# Patient Record
Sex: MALE | Race: WHITE | Employment: UNEMPLOYED | URBAN - METROPOLITAN AREA
[De-identification: names, ages, dates, MRNs, and addresses within clinical notes are randomized per-mention and may not be internally consistent; named-entity substitution may affect disease eponyms.]

---

## 2017-04-07 RX ORDER — AMLODIPINE BESYLATE 2.5 MG/1
TABLET ORAL
Qty: 30 TABLET | Refills: 0 | Status: SHIPPED | OUTPATIENT
Start: 2017-04-07 | End: 2017-05-18 | Stop reason: SDUPTHER

## 2017-04-07 RX ORDER — LISINOPRIL 20 MG/1
TABLET ORAL
Qty: 30 TABLET | Refills: 0 | Status: SHIPPED | OUTPATIENT
Start: 2017-04-07 | End: 2017-05-18 | Stop reason: SDUPTHER

## 2017-05-12 RX ORDER — LISINOPRIL 20 MG/1
TABLET ORAL
Qty: 30 TABLET | Refills: 0 | Status: CANCELLED | OUTPATIENT
Start: 2017-05-12

## 2017-05-12 RX ORDER — AMLODIPINE BESYLATE 2.5 MG/1
TABLET ORAL
Qty: 30 TABLET | Refills: 0 | Status: CANCELLED | OUTPATIENT
Start: 2017-05-12

## 2017-05-18 ENCOUNTER — OFFICE VISIT (OUTPATIENT)
Dept: FAMILY MEDICINE CLINIC | Age: 52
End: 2017-05-18

## 2017-05-18 VITALS
HEIGHT: 69 IN | WEIGHT: 169 LBS | BODY MASS INDEX: 25.03 KG/M2 | SYSTOLIC BLOOD PRESSURE: 155 MMHG | OXYGEN SATURATION: 95 % | RESPIRATION RATE: 18 BRPM | DIASTOLIC BLOOD PRESSURE: 99 MMHG | TEMPERATURE: 97.1 F | HEART RATE: 80 BPM

## 2017-05-18 DIAGNOSIS — J30.89 ALLERGIC RHINITIS DUE TO OTHER ALLERGIC TRIGGER, UNSPECIFIED RHINITIS SEASONALITY: ICD-10-CM

## 2017-05-18 DIAGNOSIS — I10 ESSENTIAL HYPERTENSION: Primary | ICD-10-CM

## 2017-05-18 DIAGNOSIS — Z23 ENCOUNTER FOR IMMUNIZATION: ICD-10-CM

## 2017-05-18 PROCEDURE — 90732 PPSV23 VACC 2 YRS+ SUBQ/IM: CPT | Performed by: NURSE PRACTITIONER

## 2017-05-18 PROCEDURE — 90472 IMMUNIZATION ADMIN EACH ADD: CPT | Performed by: NURSE PRACTITIONER

## 2017-05-18 PROCEDURE — 90715 TDAP VACCINE 7 YRS/> IM: CPT | Performed by: NURSE PRACTITIONER

## 2017-05-18 PROCEDURE — 99213 OFFICE O/P EST LOW 20 MIN: CPT | Performed by: NURSE PRACTITIONER

## 2017-05-18 PROCEDURE — 90471 IMMUNIZATION ADMIN: CPT | Performed by: NURSE PRACTITIONER

## 2017-05-18 RX ORDER — LISINOPRIL 20 MG/1
TABLET ORAL
Qty: 30 TABLET | Refills: 3 | Status: SHIPPED | OUTPATIENT
Start: 2017-05-18 | End: 2017-09-08 | Stop reason: SDUPTHER

## 2017-05-18 RX ORDER — AMLODIPINE BESYLATE 2.5 MG/1
TABLET ORAL
Qty: 30 TABLET | Refills: 3 | Status: SHIPPED | OUTPATIENT
Start: 2017-05-18 | End: 2017-09-08 | Stop reason: SDUPTHER

## 2017-05-18 ASSESSMENT — ENCOUNTER SYMPTOMS
SHORTNESS OF BREATH: 0
ABDOMINAL PAIN: 0
CONSTIPATION: 0
CHEST TIGHTNESS: 0
RHINORRHEA: 1
COUGH: 0
NAUSEA: 0
DIARRHEA: 0

## 2017-05-18 ASSESSMENT — PATIENT HEALTH QUESTIONNAIRE - PHQ9
SUM OF ALL RESPONSES TO PHQ QUESTIONS 1-9: 0
1. LITTLE INTEREST OR PLEASURE IN DOING THINGS: 0
SUM OF ALL RESPONSES TO PHQ9 QUESTIONS 1 & 2: 0
2. FEELING DOWN, DEPRESSED OR HOPELESS: 0

## 2017-10-30 RX ORDER — LISINOPRIL 20 MG/1
TABLET ORAL
Qty: 14 TABLET | Refills: 0 | OUTPATIENT
Start: 2017-10-30

## 2017-10-30 RX ORDER — AMLODIPINE BESYLATE 2.5 MG/1
TABLET ORAL
Qty: 14 TABLET | Refills: 0 | OUTPATIENT
Start: 2017-10-30

## 2017-10-31 NOTE — TELEPHONE ENCOUNTER
Next Visit Date:  Future Appointments  Date Time Provider Carlton Granados   11/2/2017 1:30 PM Jurgen Cummings, [facility] [address] [address] Labs Maintenance   Topic Date Due    Hepatitis C screen  1965    HIV screen  02/25/1978    Colon cancer screen colonoscopy  02/25/2013    Flu vaccine (1) 09/01/2017    Diabetes screen  11/02/2018    Lipid screen  11/02/2020    DTaP/Tdap/Td vaccine (2 - Td) 05/18/2027    Pneumococcal med risk  Completed       Hemoglobin A1C (%)   Date Value   11/02/2015 5.4             ( goal A1C is < 7)   No results found for: LABMICR  LDL Cholesterol (mg/dL)   Date Value   11/02/2015 98       (goal LDL is <100)   BUN (mg/dL)   Date Value   12/01/2013 12     BP Readings from Last 3 Encounters:   05/18/17 (!) 155/99   10/07/16 (!) 149/93   10/27/15 150/80          (goal 120/80)    All Future Testing planned in [facility]  Lab Frequency Next Occurrence               Patient Active Problem List:     Chest pain     Tobacco abuse     HTN (hypertension)     Hypertension

## 2017-11-01 RX ORDER — LISINOPRIL 20 MG/1
TABLET ORAL
Qty: 14 TABLET | Refills: 0 | Status: SHIPPED | OUTPATIENT
Start: 2017-11-01 | End: 2017-11-02 | Stop reason: SDUPTHER

## 2017-11-01 RX ORDER — AMLODIPINE BESYLATE 2.5 MG/1
TABLET ORAL
Qty: 14 TABLET | Refills: 0 | Status: SHIPPED | OUTPATIENT
Start: 2017-11-01 | End: 2017-11-02 | Stop reason: SDUPTHER

## 2017-11-02 ENCOUNTER — OFFICE VISIT (OUTPATIENT)
Dept: FAMILY MEDICINE CLINIC | Age: 52
End: 2017-11-02

## 2017-11-02 VITALS
OXYGEN SATURATION: 99 % | BODY MASS INDEX: 25.48 KG/M2 | SYSTOLIC BLOOD PRESSURE: 162 MMHG | TEMPERATURE: 96.8 F | HEART RATE: 96 BPM | DIASTOLIC BLOOD PRESSURE: 88 MMHG | WEIGHT: 172 LBS | HEIGHT: 69 IN | RESPIRATION RATE: 20 BRPM

## 2017-11-02 DIAGNOSIS — Z23 NEEDS FLU SHOT: ICD-10-CM

## 2017-11-02 DIAGNOSIS — Z12.11 SCREENING FOR MALIGNANT NEOPLASM OF COLON: ICD-10-CM

## 2017-11-02 DIAGNOSIS — I10 ESSENTIAL HYPERTENSION: Primary | ICD-10-CM

## 2017-11-02 DIAGNOSIS — L40.9 PSORIASIS: ICD-10-CM

## 2017-11-02 DIAGNOSIS — Z91.199 NON-COMPLIANCE: ICD-10-CM

## 2017-11-02 PROCEDURE — 96372 THER/PROPH/DIAG INJ SC/IM: CPT | Performed by: NURSE PRACTITIONER

## 2017-11-02 PROCEDURE — 90688 IIV4 VACCINE SPLT 0.5 ML IM: CPT | Performed by: NURSE PRACTITIONER

## 2017-11-02 PROCEDURE — 90472 IMMUNIZATION ADMIN EACH ADD: CPT | Performed by: NURSE PRACTITIONER

## 2017-11-02 PROCEDURE — 90471 IMMUNIZATION ADMIN: CPT | Performed by: NURSE PRACTITIONER

## 2017-11-02 PROCEDURE — 99214 OFFICE O/P EST MOD 30 MIN: CPT | Performed by: NURSE PRACTITIONER

## 2017-11-02 RX ORDER — METHYLPREDNISOLONE ACETATE 40 MG/ML
40 INJECTION, SUSPENSION INTRA-ARTICULAR; INTRALESIONAL; INTRAMUSCULAR; SOFT TISSUE ONCE
Status: COMPLETED | OUTPATIENT
Start: 2017-11-02 | End: 2017-11-02

## 2017-11-02 RX ORDER — LISINOPRIL 20 MG/1
TABLET ORAL
Qty: 90 TABLET | Refills: 1 | Status: SHIPPED | OUTPATIENT
Start: 2017-11-02 | End: 2018-05-14 | Stop reason: SDUPTHER

## 2017-11-02 RX ORDER — AMLODIPINE BESYLATE 5 MG/1
TABLET ORAL
Qty: 90 TABLET | Refills: 1 | Status: SHIPPED | OUTPATIENT
Start: 2017-11-02 | End: 2018-04-26 | Stop reason: SDUPTHER

## 2017-11-02 RX ORDER — CALCIPOTRIENE 50 UG/G
CREAM TOPICAL
Qty: 1 TUBE | Refills: 0 | Status: SHIPPED | OUTPATIENT
Start: 2017-11-02 | End: 2018-12-04 | Stop reason: ALTCHOICE

## 2017-11-02 RX ADMIN — METHYLPREDNISOLONE ACETATE 40 MG: 40 INJECTION, SUSPENSION INTRA-ARTICULAR; INTRALESIONAL; INTRAMUSCULAR; SOFT TISSUE at 14:35

## 2017-11-02 NOTE — PROGRESS NOTES
Have you seen any other physician or provider since your last visit no    Have you had any other diagnostic tests since your last visit? no    Have you changed or stopped any medications since your last visit including any over-the-counter medicines, vitamins, or herbal medicines? no     Are you taking all your prescribed medications? Yes  If NO, why? -  N/A           Patient Self-Management Goal for this visit.    What is your goal for your visit today? - htn   Barriers to success: none   Plan for overcoming my barriers: N/A      Confidence: 10/10   Date goal set: 11/2/17   Date expected to reach goal: 3days    Medical history Review  Past Medical, Family, and Social History reviewed and does not contribute to the patient presenting condition    Health Maintenance Due   Topic Date Due    Hepatitis C screen  1965    HIV screen  02/25/1978    Colon cancer screen colonoscopy  02/25/2013    Flu vaccine (1) 09/01/2017
 Alcohol use Yes      No Known Allergies    Subjective:      Review of Systems   Constitutional: Negative for chills and fever. Cardiovascular: Negative for chest pain, palpitations and leg swelling. Skin: Positive for rash. Other pertinent ROS in HPI  Objective:     BP (!) 162/88   Pulse 96   Temp 96.8 °F (36 °C) (Oral)   Resp 20   Ht 5' 8.9\" (1.75 m)   Wt 172 lb (78 kg)   SpO2 99%   BMI 25.48 kg/m²    Physical Exam   Constitutional: He is oriented to person, place, and time. He appears well-developed and well-nourished. HENT:   Head: Normocephalic and atraumatic. Right Ear: External ear normal.   Left Ear: External ear normal.   Nose: Nose normal.   Mouth/Throat: Oropharynx is clear and moist.   Eyes: Conjunctivae and EOM are normal. Pupils are equal, round, and reactive to light. Neck: Normal range of motion. Cardiovascular: Normal rate, regular rhythm and normal heart sounds. Pulmonary/Chest: Effort normal and breath sounds normal.   Abdominal: Soft. Bowel sounds are normal.   Musculoskeletal: Normal range of motion. Pain free ROM     Neurological: He is alert and oriented to person, place, and time. Gait normal     Skin: Skin is warm and dry. No rash noted. Psychiatric: He has a normal mood and affect. His behavior is normal. Judgment and thought content normal.     Assessment:       1. Essential hypertension  amLODIPine (NORVASC) 5 MG tablet    lisinopril (PRINIVIL;ZESTRIL) 20 MG tablet   2. Psoriasis  methylPREDNISolone acetate (DEPO-MEDROL) injection 40 mg    calcipotriene (DOVONEX) 0.005 % cream   3. Screening for malignant neoplasm of colon  POCT Fecal Immunochemical Test (FIT)   4. Non-compliance     5. Needs flu shot  INFLUENZA, QUADV, 3 YRS AND OLDER, IM, MDV, 0.5ML (Josh Watts)       Plan:      1.  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

## 2018-05-14 DIAGNOSIS — I10 ESSENTIAL HYPERTENSION: ICD-10-CM

## 2018-05-17 RX ORDER — LISINOPRIL 20 MG/1
TABLET ORAL
Qty: 30 TABLET | Refills: 5 | OUTPATIENT
Start: 2018-05-17

## 2018-05-18 ENCOUNTER — TELEPHONE (OUTPATIENT)
Dept: FAMILY MEDICINE CLINIC | Age: 53
End: 2018-05-18

## 2018-05-18 DIAGNOSIS — I10 ESSENTIAL HYPERTENSION: ICD-10-CM

## 2018-05-18 RX ORDER — LISINOPRIL 20 MG/1
TABLET ORAL
Qty: 90 TABLET | Refills: 0 | Status: SHIPPED | OUTPATIENT
Start: 2018-05-18 | End: 2018-05-18 | Stop reason: SDUPTHER

## 2018-05-22 RX ORDER — LISINOPRIL 20 MG/1
TABLET ORAL
Qty: 90 TABLET | Refills: 0 | Status: SHIPPED | OUTPATIENT
Start: 2018-05-22 | End: 2018-11-21 | Stop reason: SDUPTHER

## 2018-08-03 DIAGNOSIS — I10 ESSENTIAL HYPERTENSION: ICD-10-CM

## 2018-08-03 RX ORDER — AMLODIPINE BESYLATE 5 MG/1
5 TABLET ORAL DAILY
Qty: 7 TABLET | Refills: 0 | Status: SHIPPED | OUTPATIENT
Start: 2018-08-03 | End: 2018-10-26 | Stop reason: SDUPTHER

## 2018-10-26 DIAGNOSIS — I10 ESSENTIAL HYPERTENSION: ICD-10-CM

## 2018-10-26 RX ORDER — AMLODIPINE BESYLATE 5 MG/1
TABLET ORAL
Qty: 14 TABLET | Refills: 0 | Status: SHIPPED | OUTPATIENT
Start: 2018-10-26 | End: 2018-11-18 | Stop reason: SDUPTHER

## 2018-10-26 NOTE — TELEPHONE ENCOUNTER
Last visit: 11/2/18  Last Med refill: 8/3/18 #7 with 0 refills    PATIENT NEEDS APPT    Next Visit Date:  No future appointments.     Health Maintenance   Topic Date Due    Hepatitis C screen  1965    HIV screen  02/25/1978    Shingles Vaccine (1 of 2 - 2 Dose Series) 02/25/2013    Colon cancer screen colonoscopy  02/25/2013    Potassium monitoring  12/01/2014    Creatinine monitoring  12/01/2014    Flu vaccine (1) 09/01/2018    Diabetes screen  11/02/2018    Lipid screen  11/02/2020    DTaP/Tdap/Td vaccine (2 - Td) 05/18/2027    Pneumococcal med risk  Completed       Hemoglobin A1C (%)   Date Value   11/02/2015 5.4             ( goal A1C is < 7)   No results found for: LABMICR  LDL Cholesterol (mg/dL)   Date Value   11/02/2015 98   12/02/2013 69       (goal LDL is <100)   BUN (mg/dL)   Date Value   12/01/2013 12     BP Readings from Last 3 Encounters:   11/02/17 (!) 162/88   05/18/17 (!) 155/99   10/07/16 (!) 149/93          (goal 120/80)    All Future Testing planned in [facility]  Lab Frequency Next Occurrence               Patient Active Problem List:     Chest pain     Tobacco abuse     HTN (hypertension)     Hypertension

## 2018-11-20 DIAGNOSIS — I10 ESSENTIAL HYPERTENSION: ICD-10-CM

## 2018-11-20 RX ORDER — LISINOPRIL 20 MG/1
TABLET ORAL
Qty: 30 TABLET | Refills: 0 | OUTPATIENT
Start: 2018-11-20

## 2018-11-20 NOTE — TELEPHONE ENCOUNTER
Filled 5/22/18 #90 with 0 RF  Last seen 2017. Asking for enough to last until appt 12/4/18.     Next Visit Date:  Future Appointments  Date Time Provider Carlton Granados   12/4/2018 1:45 PM TARSHA Chavarria CNP [facility] [address] Maintenance   Topic Date Due    Hepatitis C screen  1965    HIV screen  02/25/1978    Shingles Vaccine (1 of 2 - 2 Dose Series) 02/25/2013    Colon cancer screen colonoscopy  02/25/2013    Potassium monitoring  12/01/2014    Creatinine monitoring  12/01/2014    Flu vaccine (1) 09/01/2018    Lipid screen  11/02/2020    DTaP/Tdap/Td vaccine (2 - Td) 05/18/2027    Pneumococcal med risk  Completed       Hemoglobin A1C (%)   Date Value   11/02/2015 5.4             ( goal A1C is < 7)   No results found for: LABMICR  LDL Cholesterol (mg/dL)   Date Value   11/02/2015 98   12/02/2013 69       (goal LDL is <100)   BUN (mg/dL)   Date Value   12/01/2013 12     BP Readings from Last 3 Encounters:   11/02/17 (!) 162/88   05/18/17 (!) 155/99   10/07/16 (!) 149/93          (goal 120/80)    All Future Testing planned in [facility]  Lab Frequency Next Occurrence               Patient Active Problem List:     Chest pain     Tobacco abuse     HTN (hypertension)     Hypertension

## 2018-11-21 RX ORDER — LISINOPRIL 20 MG/1
TABLET ORAL
Qty: 20 TABLET | Refills: 0 | Status: SHIPPED | OUTPATIENT
Start: 2018-11-21 | End: 2018-12-04 | Stop reason: SDUPTHER

## 2018-12-04 ENCOUNTER — HOSPITAL ENCOUNTER (OUTPATIENT)
Age: 53
Setting detail: SPECIMEN
Discharge: HOME | End: 2018-12-04

## 2018-12-04 ENCOUNTER — OFFICE VISIT (OUTPATIENT)
Dept: FAMILY MEDICINE CLINIC | Age: 53
End: 2018-12-04

## 2018-12-04 VITALS
DIASTOLIC BLOOD PRESSURE: 80 MMHG | HEIGHT: 68 IN | OXYGEN SATURATION: 96 % | SYSTOLIC BLOOD PRESSURE: 128 MMHG | HEART RATE: 100 BPM | TEMPERATURE: 97.4 F | WEIGHT: 182.6 LBS | BODY MASS INDEX: 27.68 KG/M2

## 2018-12-04 DIAGNOSIS — Z13.220 SCREENING FOR HYPERLIPIDEMIA: ICD-10-CM

## 2018-12-04 DIAGNOSIS — I10 ESSENTIAL HYPERTENSION: Primary | ICD-10-CM

## 2018-12-04 DIAGNOSIS — Z23 NEED FOR INFLUENZA VACCINATION: ICD-10-CM

## 2018-12-04 DIAGNOSIS — Z12.5 SCREENING FOR MALIGNANT NEOPLASM OF PROSTATE: ICD-10-CM

## 2018-12-04 DIAGNOSIS — I10 ESSENTIAL HYPERTENSION: ICD-10-CM

## 2018-12-04 LAB
ALBUMIN SERPL-MCNC: 4.7 G/DL (ref 3.5–5.2)
ALBUMIN/GLOBULIN RATIO: 1.7 (ref 1–2.5)
ALP BLD-CCNC: 60 U/L (ref 40–129)
ALT SERPL-CCNC: 41 U/L (ref 5–41)
ANION GAP SERPL CALCULATED.3IONS-SCNC: 14 MMOL/L (ref 9–17)
AST SERPL-CCNC: 29 U/L
BILIRUB SERPL-MCNC: 0.51 MG/DL (ref 0.3–1.2)
BUN BLDV-MCNC: 20 MG/DL (ref 6–20)
BUN/CREAT BLD: ABNORMAL (ref 9–20)
CALCIUM SERPL-MCNC: 9.3 MG/DL (ref 8.6–10.4)
CHLORIDE BLD-SCNC: 100 MMOL/L (ref 98–107)
CHOLESTEROL/HDL RATIO: 3
CHOLESTEROL: 205 MG/DL
CO2: 25 MMOL/L (ref 20–31)
CREAT SERPL-MCNC: 0.85 MG/DL (ref 0.7–1.2)
GFR AFRICAN AMERICAN: >60 ML/MIN
GFR NON-AFRICAN AMERICAN: >60 ML/MIN
GFR SERPL CREATININE-BSD FRML MDRD: ABNORMAL ML/MIN/{1.73_M2}
GFR SERPL CREATININE-BSD FRML MDRD: ABNORMAL ML/MIN/{1.73_M2}
GLUCOSE BLD-MCNC: 105 MG/DL (ref 70–99)
HCT VFR BLD CALC: 49.1 % (ref 40.7–50.3)
HDLC SERPL-MCNC: 69 MG/DL
HEMOGLOBIN: 16.5 G/DL (ref 13–17)
LDL CHOLESTEROL: 112 MG/DL (ref 0–130)
MCH RBC QN AUTO: 30.8 PG (ref 25.2–33.5)
MCHC RBC AUTO-ENTMCNC: 33.6 G/DL (ref 28.4–34.8)
MCV RBC AUTO: 91.6 FL (ref 82.6–102.9)
NRBC AUTOMATED: 0 PER 100 WBC
PDW BLD-RTO: 12.6 % (ref 11.8–14.4)
PLATELET # BLD: 223 K/UL (ref 138–453)
PMV BLD AUTO: 12.2 FL (ref 8.1–13.5)
POTASSIUM SERPL-SCNC: 4.4 MMOL/L (ref 3.7–5.3)
RBC # BLD: 5.36 M/UL (ref 4.21–5.77)
SODIUM BLD-SCNC: 139 MMOL/L (ref 135–144)
TOTAL PROTEIN: 7.4 G/DL (ref 6.4–8.3)
TRIGL SERPL-MCNC: 121 MG/DL
VLDLC SERPL CALC-MCNC: ABNORMAL MG/DL (ref 1–30)
WBC # BLD: 9.8 K/UL (ref 3.5–11.3)

## 2018-12-04 PROCEDURE — 90471 IMMUNIZATION ADMIN: CPT | Performed by: NURSE PRACTITIONER

## 2018-12-04 PROCEDURE — 99214 OFFICE O/P EST MOD 30 MIN: CPT | Performed by: NURSE PRACTITIONER

## 2018-12-04 PROCEDURE — 90688 IIV4 VACCINE SPLT 0.5 ML IM: CPT | Performed by: NURSE PRACTITIONER

## 2018-12-04 PROCEDURE — 36415 COLL VENOUS BLD VENIPUNCTURE: CPT | Performed by: NURSE PRACTITIONER

## 2018-12-04 RX ORDER — LISINOPRIL 20 MG/1
TABLET ORAL
Qty: 90 TABLET | Refills: 1 | Status: SHIPPED | OUTPATIENT
Start: 2018-12-04 | End: 2019-06-11 | Stop reason: SDUPTHER

## 2018-12-04 RX ORDER — AMLODIPINE BESYLATE 5 MG/1
TABLET ORAL
Qty: 90 TABLET | Refills: 1 | Status: SHIPPED | OUTPATIENT
Start: 2018-12-04 | End: 2019-06-11 | Stop reason: SDUPTHER

## 2018-12-04 ASSESSMENT — ENCOUNTER SYMPTOMS
COUGH: 0
SHORTNESS OF BREATH: 0

## 2018-12-04 ASSESSMENT — PATIENT HEALTH QUESTIONNAIRE - PHQ9
SUM OF ALL RESPONSES TO PHQ QUESTIONS 1-9: 0
SUM OF ALL RESPONSES TO PHQ QUESTIONS 1-9: 0
1. LITTLE INTEREST OR PLEASURE IN DOING THINGS: 0
2. FEELING DOWN, DEPRESSED OR HOPELESS: 0
SUM OF ALL RESPONSES TO PHQ9 QUESTIONS 1 & 2: 0

## 2018-12-04 NOTE — PATIENT INSTRUCTIONS
trying to quit smoking. · Consider signing up for a smoking cessation program, such as the American Lung Association's Freedom from Smoking program.  · Get text messaging support. Go to the website at www.smokefree. gov to sign up for the [facility] program.  · Set a quit date. Pick your date carefully so that it is not right in the middle of a big deadline or stressful time. Once you quit, do not even take a puff. Get rid of all ashtrays and lighters after your last cigarette. Clean your house and your clothes so that they do not smell of smoke. · Learn how to be a nonsmoker. Think about ways you can avoid those things that make you reach for a cigarette. ? Avoid situations that put you at greatest risk for smoking. For some people, it is hard to have a drink with friends without smoking. For others, they might skip a coffee break with coworkers who smoke. ? Change your daily routine. Take a different route to work or eat a meal in a different place. · Cut down on stress. Calm yourself or release tension by doing an activity you enjoy, such as reading a book, taking a hot bath, or gardening. · Talk to your doctor or pharmacist about nicotine replacement therapy, which replaces the nicotine in your body. You still get nicotine but you do not use tobacco. Nicotine replacement products help you slowly reduce the amount of nicotine you need. These products come in several forms, many of them available over-the-counter:  ? Nicotine patches  ? Nicotine gum and lozenges  ? Nicotine inhaler  · Ask your doctor about bupropion (Wellbutrin) or varenicline (Chantix), which are prescription medicines. They do not contain nicotine. They help you by reducing withdrawal symptoms, such as stress and anxiety. · Some people find hypnosis, acupuncture, and massage helpful for ending the smoking habit. · Eat a healthy diet and get regular exercise.  Having healthy habits will help your body move past its craving for nicotine. · Be prepared to keep trying. Most people are not successful the first few times they try to quit. Do not get mad at yourself if you smoke again. Make a list of things you learned and think about when you want to try again, such as next week, next month, or next year. Where can you learn more? Go to https://TextRecruitpeabdifatahewwill.Reachable. org and sign in to your [email or web address] account. Enter C152 in the Telepartner box to learn more about \"Stopping Smoking: Care Instructions. \"     If you do not have an account, please click on the \"Sign Up Now\" link. Current as of: November 29, 2017  Content Version: 11.8  © 7053-5204 Healthwise, Incorporated. Care instructions adapted under license by [facility] ([facility]). If you have questions about a medical condition or this instruction, always ask your healthcare professional. [address] any warranty or liability for your use of this information.

## 2018-12-04 NOTE — PROGRESS NOTES
Patient is present for medication refills  Patient last seen 11/2/17    Patient is due for labs    Pharmacy and medication reviewed with patient    Visit Information    Have you changed or started any medications since your last visit including any over-the-counter medicines, vitamins, or herbal medicines? no   Have you stopped taking any of your medications? Is so, why? -  no  Are you having any side effects from any of your medications? - no    Have you seen any other physician or provider since your last visit?  no   Have you had any other diagnostic tests since your last visit?  no   Have you been seen in the emergency room and/or had an admission in a hospital since we last saw you?  no   Have you had your routine dental cleaning in the past 6 months?  no     Do you have an active MyChart account? If no, what is the barrier?   Yes    Patient Care Team:  TARSHA Romo CNP as PCP - General (Nurse Practitioner Family)    Medical History Review  Past Medical, Family, and Social History reviewed and does contribute to the patient presenting condition    Health Maintenance   Topic Date Due    Hepatitis C screen  1965    HIV screen  02/25/1978    Shingles Vaccine (1 of 2 - 2 Dose Series) 02/25/2013    Colon cancer screen colonoscopy  02/25/2013    Potassium monitoring  12/01/2014    Creatinine monitoring  12/01/2014    Flu vaccine (1) 09/01/2018    Lipid screen  11/02/2020    DTaP/Tdap/Td vaccine (2 - Td) 05/18/2027    Pneumococcal med risk  Completed
instructions when applicable  3. Discussed use, benefit, and side effects of prescribed medications. Barriersto medication compliance addressed. All patient questions answered. Pt voiced understanding. 4.  Reviewed prior labs and health maintenance  5. Continue current medications, diet and exercise.     CompletedRefills   Requested Prescriptions     Pending Prescriptions Disp Refills    lisinopril (PRINIVIL;ZESTRIL) 20 MG tablet 90 tablet 1     Sig: take 1 tablet by mouth once daily    amLODIPine (NORVASC) 5 MG tablet 90 tablet 1     Sig: take 1 tablet by mouth once daily         Electronically signed by Ranjan Solis CNP on 12/4/2018 at 2:02 PM

## 2018-12-05 LAB — PROSTATE SPECIFIC ANTIGEN: 3.62 UG/L

## 2018-12-10 DIAGNOSIS — E78.2 MIXED HYPERLIPIDEMIA: Primary | ICD-10-CM

## 2018-12-10 RX ORDER — SIMVASTATIN 20 MG
20 TABLET ORAL NIGHTLY
Qty: 90 TABLET | Refills: 1 | Status: SHIPPED | OUTPATIENT
Start: 2018-12-10 | End: 2019-06-11 | Stop reason: SDUPTHER

## 2019-06-11 DIAGNOSIS — E78.2 MIXED HYPERLIPIDEMIA: ICD-10-CM

## 2019-06-11 DIAGNOSIS — I10 ESSENTIAL HYPERTENSION: ICD-10-CM

## 2019-06-11 RX ORDER — LISINOPRIL 20 MG/1
TABLET ORAL
Qty: 30 TABLET | Refills: 0 | Status: SHIPPED | OUTPATIENT
Start: 2019-06-11 | End: 2019-07-12 | Stop reason: SDUPTHER

## 2019-06-11 RX ORDER — SIMVASTATIN 20 MG
20 TABLET ORAL NIGHTLY
Qty: 30 TABLET | Refills: 0 | Status: SHIPPED | OUTPATIENT
Start: 2019-06-11 | End: 2019-07-12 | Stop reason: SDUPTHER

## 2019-06-11 RX ORDER — AMLODIPINE BESYLATE 5 MG/1
TABLET ORAL
Qty: 30 TABLET | Refills: 0 | Status: SHIPPED | OUTPATIENT
Start: 2019-06-11 | End: 2019-07-12 | Stop reason: SDUPTHER

## 2019-06-11 NOTE — TELEPHONE ENCOUNTER
Last visit: 12/4/18  Last Med refill: 12/4/18, 12/10/18  Does patient have enough medication for 72 hours: No:     Pt was informed short supply will be called in until appt.  Last seen 12/4/18    Next Visit Date:  Future Appointments   Date Time Provider Carlton Granados   6/25/2019  4:15 PM Stacia Valerio  [address] Maintenance   Topic Date Due    Colon cancer screen colonoscopy  02/25/2013    Diabetes screen  11/02/2018    Shingles Vaccine (1 of 2) 12/04/2019 (Originally 2/25/2013)    Hepatitis C screen  12/04/2019 (Originally 1965)    HIV screen  12/04/2019 (Originally 2/25/1978)    Potassium monitoring  12/04/2019    Creatinine monitoring  12/04/2019    Lipid screen  12/04/2023    DTaP/Tdap/Td vaccine (2 - Td) 05/18/2027    Flu vaccine  Completed    Pneumococcal 0-64 years Vaccine  Completed       Hemoglobin A1C (%)   Date Value   11/02/2015 5.4             ( goal A1C is < 7)   No results found for: LABMICR  LDL Cholesterol (mg/dL)   Date Value   12/04/2018 112   11/02/2015 98       (goal LDL is <100)   AST (U/L)   Date Value   12/04/2018 29     ALT (U/L)   Date Value   12/04/2018 41     BUN (mg/dL)   Date Value   12/04/2018 20     BP Readings from Last 3 Encounters:   12/04/18 128/80   11/02/17 (!) 162/88   05/18/17 (!) 155/99          (goal 120/80)    All Future Testing planned in [facility]  Lab Frequency Next Occurrence               Patient Active Problem List:     Chest pain     Tobacco abuse     HTN (hypertension)     Hypertension

## 2019-07-12 ENCOUNTER — TELEPHONE (OUTPATIENT)
Dept: FAMILY MEDICINE CLINIC | Age: 54
End: 2019-07-12

## 2019-07-12 DIAGNOSIS — E78.2 MIXED HYPERLIPIDEMIA: ICD-10-CM

## 2019-07-12 DIAGNOSIS — I10 ESSENTIAL HYPERTENSION: ICD-10-CM

## 2019-07-12 RX ORDER — SIMVASTATIN 20 MG
20 TABLET ORAL NIGHTLY
Qty: 30 TABLET | Refills: 0 | Status: SHIPPED | OUTPATIENT
Start: 2019-07-12 | End: 2019-08-20 | Stop reason: SDUPTHER

## 2019-07-12 RX ORDER — SIMVASTATIN 20 MG
20 TABLET ORAL NIGHTLY
Qty: 7 TABLET | Refills: 0 | Status: CANCELLED | OUTPATIENT
Start: 2019-07-12

## 2019-07-12 RX ORDER — LISINOPRIL 20 MG/1
TABLET ORAL
Qty: 30 TABLET | Refills: 0 | Status: SHIPPED | OUTPATIENT
Start: 2019-07-12 | End: 2019-08-20 | Stop reason: SDUPTHER

## 2019-07-12 RX ORDER — AMLODIPINE BESYLATE 5 MG/1
TABLET ORAL
Qty: 30 TABLET | Refills: 0 | Status: SHIPPED | OUTPATIENT
Start: 2019-07-12 | End: 2019-09-10 | Stop reason: SDUPTHER

## 2019-07-12 RX ORDER — LISINOPRIL 20 MG/1
TABLET ORAL
Qty: 7 TABLET | Refills: 0 | Status: CANCELLED | OUTPATIENT
Start: 2019-07-12

## 2019-07-12 RX ORDER — AMLODIPINE BESYLATE 5 MG/1
TABLET ORAL
Qty: 7 TABLET | Refills: 0 | Status: CANCELLED | OUTPATIENT
Start: 2019-07-12

## 2019-08-20 DIAGNOSIS — I10 ESSENTIAL HYPERTENSION: ICD-10-CM

## 2019-08-20 DIAGNOSIS — E78.2 MIXED HYPERLIPIDEMIA: ICD-10-CM

## 2019-08-20 RX ORDER — SIMVASTATIN 20 MG
20 TABLET ORAL NIGHTLY
Qty: 30 TABLET | Refills: 0 | Status: SHIPPED | OUTPATIENT
Start: 2019-08-20 | End: 2019-09-10 | Stop reason: SDUPTHER

## 2019-08-20 RX ORDER — LISINOPRIL 20 MG/1
TABLET ORAL
Qty: 30 TABLET | Refills: 0 | Status: SHIPPED | OUTPATIENT
Start: 2019-08-20 | End: 2019-09-10 | Stop reason: SDUPTHER

## 2019-08-20 NOTE — TELEPHONE ENCOUNTER
Last visit: 12/4/18  Last Med refill: 7/12/19  Does patient have enough medication for 72 hours: No:     Next Visit Date:  Future Appointments   Date Time Provider Carlton Granados   9/10/2019 10:15 AM Stacia Arzola  [address] Maintenance   Topic Date Due    Colon cancer screen colonoscopy  02/25/2013    Diabetes screen  11/02/2018    Shingles Vaccine (1 of 2) 12/04/2019 (Originally 2/25/2013)    Hepatitis C screen  12/04/2019 (Originally 1965)    HIV screen  12/04/2019 (Originally 2/25/1978)    Flu vaccine (1) 09/01/2019    Potassium monitoring  12/04/2019    Creatinine monitoring  12/04/2019    Lipid screen  12/04/2023    DTaP/Tdap/Td vaccine (2 - Td) 05/18/2027    Pneumococcal 0-64 years Vaccine  Completed       Hemoglobin A1C (%)   Date Value   11/02/2015 5.4             ( goal A1C is < 7)   No results found for: LABMICR  LDL Cholesterol (mg/dL)   Date Value   12/04/2018 112   11/02/2015 98       (goal LDL is <100)   AST (U/L)   Date Value   12/04/2018 29     ALT (U/L)   Date Value   12/04/2018 41     BUN (mg/dL)   Date Value   12/04/2018 20     BP Readings from Last 3 Encounters:   12/04/18 128/80   11/02/17 (!) 162/88   05/18/17 (!) 155/99          (goal 120/80)    All Future Testing planned in [facility]  Lab Frequency Next Occurrence               Patient Active Problem List:     Chest pain     Tobacco abuse     HTN (hypertension)     Hypertension

## 2019-09-10 ENCOUNTER — OFFICE VISIT (OUTPATIENT)
Dept: FAMILY MEDICINE CLINIC | Age: 54
End: 2019-09-10

## 2019-09-10 VITALS
OXYGEN SATURATION: 97 % | HEART RATE: 86 BPM | HEIGHT: 68 IN | DIASTOLIC BLOOD PRESSURE: 83 MMHG | BODY MASS INDEX: 27.07 KG/M2 | WEIGHT: 178.6 LBS | SYSTOLIC BLOOD PRESSURE: 128 MMHG | TEMPERATURE: 98.3 F

## 2019-09-10 DIAGNOSIS — Z72.0 TOBACCO ABUSE: ICD-10-CM

## 2019-09-10 DIAGNOSIS — I10 ESSENTIAL HYPERTENSION: Primary | ICD-10-CM

## 2019-09-10 DIAGNOSIS — Z12.5 SCREENING FOR PROSTATE CANCER: ICD-10-CM

## 2019-09-10 DIAGNOSIS — Z23 FLU VACCINE NEED: ICD-10-CM

## 2019-09-10 DIAGNOSIS — E78.2 MIXED HYPERLIPIDEMIA: ICD-10-CM

## 2019-09-10 PROCEDURE — 90688 IIV4 VACCINE SPLT 0.5 ML IM: CPT | Performed by: INTERNAL MEDICINE

## 2019-09-10 PROCEDURE — 90471 IMMUNIZATION ADMIN: CPT | Performed by: INTERNAL MEDICINE

## 2019-09-10 PROCEDURE — 99214 OFFICE O/P EST MOD 30 MIN: CPT | Performed by: INTERNAL MEDICINE

## 2019-09-10 RX ORDER — LISINOPRIL 20 MG/1
TABLET ORAL
Qty: 30 TABLET | Refills: 0 | Status: SHIPPED | OUTPATIENT
Start: 2019-09-10 | End: 2019-10-16 | Stop reason: SDUPTHER

## 2019-09-10 RX ORDER — AMLODIPINE BESYLATE 5 MG/1
TABLET ORAL
Qty: 30 TABLET | Refills: 0 | Status: SHIPPED | OUTPATIENT
Start: 2019-09-10 | End: 2019-10-16 | Stop reason: SDUPTHER

## 2019-09-10 RX ORDER — SIMVASTATIN 20 MG
20 TABLET ORAL NIGHTLY
Qty: 30 TABLET | Refills: 0 | Status: SHIPPED | OUTPATIENT
Start: 2019-09-10 | End: 2019-10-16 | Stop reason: SDUPTHER

## 2019-09-10 ASSESSMENT — ENCOUNTER SYMPTOMS
SHORTNESS OF BREATH: 0
COUGH: 0

## 2019-09-10 ASSESSMENT — PATIENT HEALTH QUESTIONNAIRE - PHQ9
2. FEELING DOWN, DEPRESSED OR HOPELESS: 0
SUM OF ALL RESPONSES TO PHQ9 QUESTIONS 1 & 2: 0
1. LITTLE INTEREST OR PLEASURE IN DOING THINGS: 0
SUM OF ALL RESPONSES TO PHQ QUESTIONS 1-9: 0
SUM OF ALL RESPONSES TO PHQ QUESTIONS 1-9: 0

## 2019-09-10 NOTE — PROGRESS NOTES
[address] [place]  [phone]    Luis Leavitt is a 62 y.o. male who presents today for his  medical conditions/complaints as noted below. Luis Leavitt is c/o of No chief complaint on file. HPI:     Started on simvastatin last visit based on labs, he is tolerating well without myalgias, dyspepsias, jaundice. Hypertension: Patient here for follow-up of elevated blood pressure. He is not exercising and is adherent to low salt diet. Blood pressure is not well controlled at home. Cardiac symptoms none. Patient denies chest pain, chest pressure/discomfort, exertional chest pressure/discomfort, irregular heart beat and lower extremity edema. Cardiovascular risk factors: hypertension, male gender, sedentary lifestyle and smoking/ tobacco exposure. Use of agents associated with hypertension: none. History of target organ damage: none. Needs an eye exam.     Diet - unchanged   Exercise- riding his bike 50-60 miles per week, usually rides his bike to work in summer   Smokes 1/2 ppd  He is drinking daily - 2-6 beers after work     Nursing note reviewed and discussed with patient. Patient's medications, allergies, past medical, surgical, social and family histories were reviewed and updated asappropriate. Current Outpatient Medications   Medication Sig Dispense Refill    simvastatin (ZOCOR) 20 MG tablet Take 1 tablet by mouth nightly 30 tablet 0    lisinopril (PRINIVIL;ZESTRIL) 20 MG tablet Take one tablet by mouth daily 30 tablet 0    amLODIPine (NORVASC) 5 MG tablet Take one tablet by mouth daily 30 tablet 0     No current facility-administered medications for this visit. Past Medical History:   Diagnosis Date    Hypertension       Past Surgical History:   Procedure Laterality Date    WISDOM TOOTH EXTRACTION      only 2 taken out     No family history on file.   Social History     Tobacco Use    Smoking status: Current Every Day Smoker     Packs/day: 0.50     Years: 30.00     Pack years: 15.00    Smokeless tobacco: Never Used   Substance Use Topics    Alcohol use: Yes      No Known Allergies    Subjective:      Review of Systems   Constitutional: Negative for chills and fever. Respiratory: Negative for cough and shortness of breath. Cardiovascular: Negative for chest pain, palpitations and leg swelling. Other pertinent ROS in HPI  Objective:     /83 (Site: Right Upper Arm, Position: Sitting, Cuff Size: Medium Adult) Comment: manual  Pulse 86   Temp 98.3 °F (36.8 °C) (Oral)   Ht 5' 7.99\" (1.727 m)   Wt 178 lb 9.6 oz (81 kg)   SpO2 97%   BMI 27.16 kg/m²      Physical Exam   Constitutional: He is oriented to person, place, and time. He appears well-developed and well-nourished. No distress. Cardiovascular: Normal rate, regular rhythm and normal heart sounds. Exam reveals no gallop and no friction rub. No murmur heard. Pulmonary/Chest: Effort normal and breath sounds normal. No respiratory distress. He has no wheezes. Abdominal: Soft. Bowel sounds are normal. He exhibits no mass. There is no tenderness. There is no guarding. Musculoskeletal: Normal range of motion. Neurological: He is alert and oriented to person, place, and time. Skin: Skin is warm and dry. Psychiatric: He has a normal mood and affect. Nursing note and vitals reviewed. Assessment/PLAN     1. Essential hypertension  - amLODIPine (NORVASC) 5 MG tablet; Take one tablet by mouth daily  Dispense: 30 tablet; Refill: 0  - lisinopril (PRINIVIL;ZESTRIL) 20 MG tablet; Take one tablet by mouth daily  Dispense: 30 tablet; Refill: 0  - Comprehensive Metabolic Panel; Future    2. Mixed hyperlipidemia  - simvastatin (ZOCOR) 20 MG tablet; Take 1 tablet by mouth nightly  Dispense: 30 tablet; Refill: 0  - Lipid, Fasting; Future  - Comprehensive Metabolic Panel; Future    3. Tobacco abuse  precontemplative re: cessation     4.  Flu vaccine need  -

## 2019-10-16 DIAGNOSIS — E78.2 MIXED HYPERLIPIDEMIA: ICD-10-CM

## 2019-10-16 DIAGNOSIS — I10 ESSENTIAL HYPERTENSION: ICD-10-CM

## 2019-10-18 RX ORDER — AMLODIPINE BESYLATE 5 MG/1
TABLET ORAL
Qty: 90 TABLET | Refills: 1 | Status: SHIPPED | OUTPATIENT
Start: 2019-10-18 | End: 2020-04-09

## 2019-10-18 RX ORDER — LISINOPRIL 20 MG/1
TABLET ORAL
Qty: 90 TABLET | Refills: 1 | Status: SHIPPED | OUTPATIENT
Start: 2019-10-18 | End: 2020-04-09

## 2019-10-18 RX ORDER — SIMVASTATIN 20 MG
20 TABLET ORAL NIGHTLY
Qty: 90 TABLET | Refills: 1 | Status: SHIPPED | OUTPATIENT
Start: 2019-10-18 | End: 2020-04-09

## 2020-04-09 RX ORDER — LISINOPRIL 20 MG/1
TABLET ORAL
Qty: 90 TABLET | Refills: 0 | Status: SHIPPED | OUTPATIENT
Start: 2020-04-09 | End: 2020-07-13 | Stop reason: SDUPTHER

## 2020-04-09 RX ORDER — AMLODIPINE BESYLATE 5 MG/1
TABLET ORAL
Qty: 90 TABLET | Refills: 0 | Status: SHIPPED | OUTPATIENT
Start: 2020-04-09 | End: 2020-07-13 | Stop reason: SDUPTHER

## 2020-04-09 RX ORDER — SIMVASTATIN 20 MG
20 TABLET ORAL NIGHTLY
Qty: 90 TABLET | Refills: 0 | Status: SHIPPED | OUTPATIENT
Start: 2020-04-09 | End: 2020-07-13 | Stop reason: SDUPTHER

## 2020-04-09 NOTE — TELEPHONE ENCOUNTER
Last visit: 9/10/19  Last Med refill: 10/18/19  Does patient have enough medication for 72 hours: Yes    PATIENT NEEDS APPT    Next Visit Date:  No future appointments.     Health Maintenance   Topic Date Due    Hepatitis C screen  1965    HIV screen  02/25/1978    Shingles Vaccine (1 of 2) 02/25/2013    Colon cancer screen colonoscopy  02/25/2013    Diabetes screen  11/02/2018    Lipid screen  12/04/2019    Potassium monitoring  12/04/2019    Creatinine monitoring  12/04/2019    DTaP/Tdap/Td vaccine (2 - Td) 05/18/2027    Flu vaccine  Completed    Pneumococcal 0-64 years Vaccine  Completed    Hepatitis A vaccine  Aged Out    Hepatitis B vaccine  Aged Out    Hib vaccine  Aged Out    Meningococcal (ACWY) vaccine  Aged Out       Hemoglobin A1C (%)   Date Value   11/02/2015 5.4             ( goal A1C is < 7)   No results found for: LABMICR  LDL Cholesterol (mg/dL)   Date Value   12/04/2018 112   11/02/2015 98       (goal LDL is <100)   AST (U/L)   Date Value   12/04/2018 29     ALT (U/L)   Date Value   12/04/2018 41     BUN (mg/dL)   Date Value   12/04/2018 20     BP Readings from Last 3 Encounters:   09/10/19 128/83   12/04/18 128/80   11/02/17 (!) 162/88          (goal 120/80)    All Future Testing planned in [facility]  Lab Frequency Next Occurrence   Lipid, Fasting Once 04/01/2020   Comprehensive Metabolic Panel Once 54/54/8159   PSA Screening Once 04/01/2020               Patient Active Problem List:     Chest pain     Tobacco abuse     HTN (hypertension)     Hypertension     Mixed hyperlipidemia

## 2020-07-06 RX ORDER — SIMVASTATIN 20 MG
20 TABLET ORAL NIGHTLY
Qty: 90 TABLET | Refills: 0 | OUTPATIENT
Start: 2020-07-06

## 2020-07-06 RX ORDER — AMLODIPINE BESYLATE 5 MG/1
TABLET ORAL
Qty: 90 TABLET | Refills: 0 | OUTPATIENT
Start: 2020-07-06

## 2020-07-06 RX ORDER — LISINOPRIL 20 MG/1
TABLET ORAL
Qty: 90 TABLET | Refills: 0 | OUTPATIENT
Start: 2020-07-06

## 2020-07-13 RX ORDER — SIMVASTATIN 20 MG
20 TABLET ORAL NIGHTLY
Qty: 30 TABLET | Refills: 0 | Status: SHIPPED | OUTPATIENT
Start: 2020-07-13 | End: 2020-08-03 | Stop reason: SDUPTHER

## 2020-07-13 RX ORDER — AMLODIPINE BESYLATE 5 MG/1
TABLET ORAL
Qty: 30 TABLET | Refills: 0 | Status: SHIPPED | OUTPATIENT
Start: 2020-07-13 | End: 2020-08-03 | Stop reason: SDUPTHER

## 2020-07-13 RX ORDER — LISINOPRIL 20 MG/1
TABLET ORAL
Qty: 30 TABLET | Refills: 0 | Status: SHIPPED | OUTPATIENT
Start: 2020-07-13 | End: 2020-08-03 | Stop reason: SDUPTHER

## 2020-07-13 NOTE — TELEPHONE ENCOUNTER
Last visit: 9/10/19  Last Med refill: 4/9/20  Does patient have enough medication for 72 hours: No: informed patient he will need an appt for future refills    Next Visit Date:  Future Appointments   Date Time Provider Carlton Granados   8/3/2020  9:30 AM TARSHA Vazquez CNP [facility] [address] Maintenance   Topic Date Due    Hepatitis C screen  1965    HIV screen  02/25/1978    Shingles Vaccine (1 of 2) 02/25/2013    Colon cancer screen colonoscopy  02/25/2013    Diabetes screen  11/02/2018    Lipid screen  12/04/2019    Potassium monitoring  12/04/2019    Creatinine monitoring  12/04/2019    Flu vaccine (1) 09/01/2020    DTaP/Tdap/Td vaccine (2 - Td) 05/18/2027    Pneumococcal 0-64 years Vaccine  Completed    Hepatitis A vaccine  Aged Out    Hepatitis B vaccine  Aged Out    Hib vaccine  Aged Out    Meningococcal (ACWY) vaccine  Aged Out       Hemoglobin A1C (%)   Date Value   11/02/2015 5.4             ( goal A1C is < 7)   No results found for: LABMICR  LDL Cholesterol (mg/dL)   Date Value   12/04/2018 112   11/02/2015 98       (goal LDL is <100)   AST (U/L)   Date Value   12/04/2018 29     ALT (U/L)   Date Value   12/04/2018 41     BUN (mg/dL)   Date Value   12/04/2018 20     BP Readings from Last 3 Encounters:   09/10/19 128/83   12/04/18 128/80   11/02/17 (!) 162/88          (goal 120/80)    All Future Testing planned in [facility]  Lab Frequency Next Occurrence   Lipid, Fasting Once 08/23/2020   Comprehensive Metabolic Panel Once 66/55/2752   PSA Screening Once 08/23/2020               Patient Active Problem List:     Chest pain     Tobacco abuse     HTN (hypertension)     Hypertension     Mixed hyperlipidemia

## 2020-08-03 ENCOUNTER — HOSPITAL ENCOUNTER (OUTPATIENT)
Age: 55
Setting detail: SPECIMEN
Discharge: HOME | End: 2020-08-03

## 2020-08-03 ENCOUNTER — OFFICE VISIT (OUTPATIENT)
Dept: FAMILY MEDICINE CLINIC | Age: 55
End: 2020-08-03

## 2020-08-03 VITALS
OXYGEN SATURATION: 99 % | HEART RATE: 94 BPM | WEIGHT: 177.2 LBS | DIASTOLIC BLOOD PRESSURE: 78 MMHG | TEMPERATURE: 97.5 F | SYSTOLIC BLOOD PRESSURE: 122 MMHG | BODY MASS INDEX: 26.95 KG/M2

## 2020-08-03 LAB
ESTIMATED AVERAGE GLUCOSE: 117 MG/DL
HBA1C MFR BLD: 5.7 % (ref 4–6)

## 2020-08-03 PROCEDURE — 99214 OFFICE O/P EST MOD 30 MIN: CPT | Performed by: NURSE PRACTITIONER

## 2020-08-03 RX ORDER — ADHESIVE BANDAGE 3/4"
1 BANDAGE TOPICAL ONCE
Qty: 1 EACH | Refills: 0 | Status: SHIPPED | OUTPATIENT
Start: 2020-08-03 | End: 2021-06-10

## 2020-08-03 RX ORDER — SIMVASTATIN 20 MG
20 TABLET ORAL NIGHTLY
Qty: 90 TABLET | Refills: 1 | Status: SHIPPED | OUTPATIENT
Start: 2020-08-03 | End: 2021-02-04

## 2020-08-03 RX ORDER — LISINOPRIL 20 MG/1
TABLET ORAL
Qty: 90 TABLET | Refills: 1 | Status: SHIPPED | OUTPATIENT
Start: 2020-08-03 | End: 2021-02-04

## 2020-08-03 RX ORDER — AMLODIPINE BESYLATE 5 MG/1
TABLET ORAL
Qty: 90 TABLET | Refills: 1 | Status: SHIPPED | OUTPATIENT
Start: 2020-08-03 | End: 2021-02-04

## 2020-08-03 ASSESSMENT — PATIENT HEALTH QUESTIONNAIRE - PHQ9
SUM OF ALL RESPONSES TO PHQ9 QUESTIONS 1 & 2: 0
2. FEELING DOWN, DEPRESSED OR HOPELESS: 0
SUM OF ALL RESPONSES TO PHQ QUESTIONS 1-9: 0
1. LITTLE INTEREST OR PLEASURE IN DOING THINGS: 0
SUM OF ALL RESPONSES TO PHQ QUESTIONS 1-9: 0

## 2020-08-03 ASSESSMENT — ENCOUNTER SYMPTOMS
SHORTNESS OF BREATH: 0
COUGH: 0

## 2020-08-03 NOTE — PROGRESS NOTES
[address] [place]  [phone]    Fausto Orlando is a 60 y.o. male who presents today for his  medical conditions/complaints as noted below. Fausto Orlando is c/o of Medication Refill  . HPI:     HPI   Hypertension: Patient here for follow-up of elevated blood pressure. He is exercising and is adherent to low salt diet. Blood pressure is well controlled at home. Cardiac symptoms none. Patient denies chest pain, chest pressure/discomfort, exertional chest pressure/discomfort, irregular heart beat and lower extremity edema. Cardiovascular risk factors: advanced age (older than 54 for men, 72 for women), hypertension, male gender and smoking/ tobacco exposure. Use of agents associated with hypertension: none. History of target organ damage: none. He has cut back on smoking. He rides his bike 4-5 miles a day. Nursing note reviewed and discussed with patient. Patient's medications, allergies, past medical, surgical, social and family histories were reviewed and updated asappropriate. Current Outpatient Medications   Medication Sig Dispense Refill    simvastatin (ZOCOR) 20 MG tablet Take 1 tablet by mouth nightly 90 tablet 1    lisinopril (PRINIVIL;ZESTRIL) 20 MG tablet take 1 tablet by mouth once daily 90 tablet 1    amLODIPine (NORVASC) 5 MG tablet take 1 tablet by mouth once daily 90 tablet 1    Blood Pressure Monitoring (BLOOD PRESSURE CUFF) MISC 1 Device by Does not apply route once for 1 dose 1 each 0     No current facility-administered medications for this visit.       Past Medical History:   Diagnosis Date    Hypertension       Past Surgical History:   Procedure Laterality Date    WISDOM TOOTH EXTRACTION      only 2 taken out     Family History   Problem Relation Age of Onset    Heart Disease Mother 80        triple bypass at 80     Social History     Tobacco Use    Smoking status: Current Every Day Smoker     Packs/day: plan      Patient given educational materials - see patientinstructions. Discussed use, benefit, and side effects of prescribed medications. All patient questions answered. Pt voiced understanding. Reviewed health maintenance. Instructed to continue current medications, diet andexercise. 1.  Jason Sheffield received counseling on the following healthy behaviors: nutrition, exercise and medication adherence  2. Patient given educationalmaterials when available - see patient instructions when applicable  3. Discussed use, benefit, and side effects of prescribed medications. Barriersto medication compliance addressed. All patient questions answered. Pt voiced understanding. 4.  Reviewed prior labs and health maintenance when applicable. 5.  Continue current medications, diet and exercise. CompletedRefills   Requested Prescriptions     Signed Prescriptions Disp Refills    simvastatin (ZOCOR) 20 MG tablet 90 tablet 1     Sig: Take 1 tablet by mouth nightly    lisinopril (PRINIVIL;ZESTRIL) 20 MG tablet 90 tablet 1     Sig: take 1 tablet by mouth once daily    amLODIPine (NORVASC) 5 MG tablet 90 tablet 1     Sig: take 1 tablet by mouth once daily    Blood Pressure Monitoring (BLOOD PRESSURE CUFF) MISC 1 each 0     Si Device by Does not apply route once for 1 dose       This note was transcribed using dictation with Dragon services. Efforts were made to correct any errors but some words may be misinterpreted.     Electronically signed by David Sanchez CNP on 8/3/2020 at 9:40 AM

## 2021-02-19 ENCOUNTER — HOSPITAL ENCOUNTER (EMERGENCY)
Age: 56
Discharge: HOME | End: 2021-02-19
Attending: EMERGENCY MEDICINE
Payer: COMMERCIAL

## 2021-02-19 ENCOUNTER — APPOINTMENT (OUTPATIENT)
Dept: GENERAL RADIOLOGY | Age: 56
End: 2021-02-19
Payer: COMMERCIAL

## 2021-02-19 VITALS
HEIGHT: 67 IN | OXYGEN SATURATION: 96 % | SYSTOLIC BLOOD PRESSURE: 141 MMHG | RESPIRATION RATE: 14 BRPM | TEMPERATURE: 98.3 F | HEART RATE: 112 BPM | DIASTOLIC BLOOD PRESSURE: 94 MMHG | WEIGHT: 177 LBS | BODY MASS INDEX: 27.78 KG/M2

## 2021-02-19 DIAGNOSIS — S82.402A CLOSED FRACTURE OF LEFT TIBIA AND FIBULA, INITIAL ENCOUNTER: Primary | ICD-10-CM

## 2021-02-19 DIAGNOSIS — S82.202A CLOSED FRACTURE OF LEFT TIBIA AND FIBULA, INITIAL ENCOUNTER: Primary | ICD-10-CM

## 2021-02-19 PROCEDURE — 6370000000 HC RX 637 (ALT 250 FOR IP): Performed by: STUDENT IN AN ORGANIZED HEALTH CARE EDUCATION/TRAINING PROGRAM

## 2021-02-19 PROCEDURE — 73610 X-RAY EXAM OF ANKLE: CPT

## 2021-02-19 PROCEDURE — 29515 APPLICATION SHORT LEG SPLINT: CPT

## 2021-02-19 PROCEDURE — 73590 X-RAY EXAM OF LOWER LEG: CPT

## 2021-02-19 PROCEDURE — 99285 EMERGENCY DEPT VISIT HI MDM: CPT

## 2021-02-19 RX ORDER — IBUPROFEN 800 MG/1
800 TABLET ORAL ONCE
Status: COMPLETED | OUTPATIENT
Start: 2021-02-19 | End: 2021-02-19

## 2021-02-19 RX ORDER — ACETAMINOPHEN 500 MG
1000 TABLET ORAL 3 TIMES DAILY
Qty: 30 TABLET | Refills: 1 | Status: SHIPPED | OUTPATIENT
Start: 2021-02-19 | End: 2021-06-10

## 2021-02-19 RX ORDER — IBUPROFEN 800 MG/1
800 TABLET ORAL 2 TIMES DAILY PRN
Qty: 30 TABLET | Refills: 0 | Status: SHIPPED | OUTPATIENT
Start: 2021-02-19 | End: 2021-06-10

## 2021-02-19 RX ORDER — OXYCODONE HYDROCHLORIDE AND ACETAMINOPHEN 5; 325 MG/1; MG/1
1 TABLET ORAL ONCE
Status: DISCONTINUED | OUTPATIENT
Start: 2021-02-19 | End: 2021-02-19

## 2021-02-19 RX ADMIN — IBUPROFEN 800 MG: 800 TABLET, FILM COATED ORAL at 14:52

## 2021-02-19 ASSESSMENT — ENCOUNTER SYMPTOMS
NAUSEA: 0
SHORTNESS OF BREATH: 0
VOMITING: 0

## 2021-02-19 ASSESSMENT — PAIN SCALES - GENERAL: PAINLEVEL_OUTOF10: 7

## 2021-02-19 NOTE — ED NOTES
Pt states he was outside in flip flops grabbing the mail when he slipped and fell onto his left ankle. He states that he heard a pop when he fell and now can barely walk on it. He has limited range of motion in the left lower extremity, and is mostly in pain when he walks.       Jerel Morales RN  02/19/21 [id number]

## 2021-02-19 NOTE — ED PROVIDER NOTES
Faculty Sign-Out Attestation  Handoff taken on the following patient from prior Attending Physician: Damon Presley    I was available and discussed any additional care issues that arose and coordinated the management plans with the resident(s) caring for the patient during my duty period. Any areas of disagreement with residents documentation of care or procedures are noted on the chart. I was personally present for the key portions of any/all procedures during my duty period. I have documented in the chart those procedures where I was not present during the key portions. XR ANKLE LEFT (MIN 3 VIEWS)   Final Result   Acute, cortex width posterolateral displaced transverse fracture distal left   fibular metadiaphysis. Soft tissue edema, presumably reactive edema, contusion and/or sprain. XR TIBIA FIBULA LEFT (2 VIEWS)   Final Result   Nondisplaced distal fibula fracture.                Celia Diaz MD  Attending Physician         Celia Diaz MD  02/19/21 2032

## 2021-02-19 NOTE — ED PROVIDER NOTES
[facility] ED     Emergency Department     Faculty Attestation        I performed a history and physical examination of the patient and discussed management with the resident. I reviewed the residents note and agree with the documented findings and plan of care. Any areas of disagreement are noted on the chart. I was personally present for the key portions of any procedures. I have documented in the chart those procedures where I was not present during the key portions. I have reviewed the emergency nurses triage note. I agree with the chief complaint, past medical history, past surgical history, allergies, medications, social and family history as documented unless otherwise noted below. For Physician Assistant/ Nurse Practitioner cases/documentation I have personally evaluated this patient and have completed at least one if not all key elements of the E/M (history, physical exam, and MDM). Additional findings are as noted. Vital Signs: BP: (!) 152/99  Pulse: 134  Resp: 16  Temp: 98.3 °F (36.8 °C) SpO2: 96 %  PCP:  Jose Miguel Maynard, APRN - CNP    Pertinent Comments:     Patient is a 24-year-old male who injured his left foot with hyperflexion. Denies any loss of consciousness or head or neck injury. Patient experiencing pain over the lateral malleoli. No pain over the base of fifth metatarsal and is distal strength 5/5 sensation light touch intact. DP/PT pulses are strong and equal bilaterally with capillary refill brisk and less than 2 seconds. Assessment/plan: Injury to the left ankle with pain on the lateral malleoli and will obtain x-ray of the tib-fib as well as ankle and attempt symptomatic control with reevaluation after    Critical Care  None    This patient was evaluated in the Emergency Department for symptoms described in the history of present illness.  He/she was evaluated in the context of the global COVID-19 pandemic, which necessitated consideration that the patient might be at risk for infection with the SARS-CoV-2 virus that causes COVID-19. Institutional protocols and algorithms that pertain to the evaluation of patients at risk for COVID-19 are in a state of rapid change based on information released by regulatory bodies including the CDC and federal and state organizations. These policies and algorithms were followed during the patient's care in the ED. (Please note that portions of this note were completed with a voice recognition program. Efforts were made to edit the dictations but occasionally words are mis-transcribed.  Whenever words are used in this note in any gender, they shall be construed as though they were used in the gender appropriate to the circumstances; and whenever words are used in this note in the singular or plural form, they shall be construed as though they were used in the form appropriate to the circumstances.)    MD Chencho Wu  Attending Emergency Medicine Physician           Ramonita Cassidy MD  02/19/21 1239

## 2021-02-19 NOTE — ED PROVIDER NOTES
Not on file     Minutes per session: Not on file    Stress: Not on file   Relationships    Social connections     Talks on phone: Not on file     Gets together: Not on file     Attends [employer] service: Not on file     Active member of club or organization: Not on file     Attends meetings of clubs or organizations: Not on file     Relationship status: Not on file    Intimate partner violence     Fear of current or ex partner: Not on file     Emotionally abused: Not on file     Physically abused: Not on file     Forced sexual activity: Not on file   Other Topics Concern    Not on file   Social History Narrative    Not on file       I counseled the patient against using tobacco products. Family History   Problem Relation Age of Onset    Heart Disease Mother 80        triple bypass at 80     No other pertinent FamHx on review with patient/guardian. Allergies:  Patient has no known allergies. Home Medications:  Prior to Admission medications    Medication Sig Start Date End Date Taking? Authorizing Provider   ibuprofen (ADVIL;MOTRIN) 800 MG tablet Take 1 tablet by mouth 2 times daily as needed for Pain 2/19/21  Yes Petersburg Grief, DO   acetaminophen (TYLENOL) 500 MG tablet Take 2 tablets by mouth 3 times daily 2/19/21  Yes [place] Grief, DO   amLODIPine (NORVASC) 5 MG tablet take 1 tablet by mouth once daily 2/4/21   Marbella Bethea APRN - CNP   simvastatin (ZOCOR) 20 MG tablet take 1 tablet by mouth nightly 2/4/21   TARSHA Kowalski - CNP   lisinopril (PRINIVIL;ZESTRIL) 20 MG tablet take 1 tablet by mouth once daily 2/4/21   TARSHA Kowalski - CNP   Blood Pressure Monitoring (BLOOD PRESSURE CUFF) MISC 1 Device by Does not apply route once for 1 dose 8/3/20 8/3/20  Marbella Bethea APRN - CNP       REVIEW OF SYSTEMS    (2-9 systems for level 4, 10 ormore for level 5)      Review of Systems   Eyes: Negative for visual disturbance.    Respiratory: Negative for shortness of breath. Cardiovascular: Negative for chest pain. Gastrointestinal: Negative for nausea and vomiting. Musculoskeletal:        Positive for left ankle pain   Skin: Negative for wound. Allergic/Immunologic: Negative for immunocompromised state. Neurological: Negative for weakness, numbness and headaches. Hematological: Does not bruise/bleed easily. PHYSICAL EXAM   (up to 7 for level 4, 8 or more for level 5)      INITIAL VITALS:   BP (!) 141/94   Pulse 112   Temp 98.3 °F (36.8 °C) (Skin)   Resp 14   Ht 5' 7\" (1.702 m)   Wt 177 lb (80.3 kg)   SpO2 96%   BMI 27.72 kg/m²     Physical Exam  Constitutional:       General: He is not in acute distress. Appearance: Normal appearance. He is not ill-appearing. HENT:      Head: Normocephalic and atraumatic. Right Ear: External ear normal.      Left Ear: External ear normal.   Eyes:      General:         Right eye: No discharge. Left eye: No discharge. Cardiovascular:      Rate and Rhythm: Normal rate and regular rhythm. Pulses: Normal pulses. Heart sounds: No murmur. Pulmonary:      Effort: Pulmonary effort is normal. No respiratory distress. Breath sounds: Normal breath sounds. No wheezing, rhonchi or rales. Musculoskeletal:      Comments: Area of swelling and tenderness proximal to lateral malleolus over left fibula. Positive squeeze test.  Pain with inversion of foot. No tenderness of foot or knee. Normal ROM with flexion extension. Negative ankle anterior/posterior drawer test.  Pedal pulses 2+, cap refill brisk. Able to wiggle toes. Sensation intact. Skin:     Capillary Refill: Capillary refill takes less than 2 seconds. Neurological:      General: No focal deficit present. Mental Status: He is alert.        DIFFERENTIAL  DIAGNOSIS     PLAN (LABS / IMAGING / EKG):  Orders Placed This Encounter   Procedures    XR ANKLE LEFT (MIN 3 VIEWS)    XR TIBIA FIBULA LEFT (2 VIEWS)     MEDICATIONS ORDERED:  Orders Placed This Encounter   Medications    DISCONTD: oxyCODONE-acetaminophen (PERCOCET) 5-325 MG per tablet 1 tablet    ibuprofen (ADVIL;MOTRIN) tablet 800 mg    ibuprofen (ADVIL;MOTRIN) 800 MG tablet     Sig: Take 1 tablet by mouth 2 times daily as needed for Pain     Dispense:  30 tablet     Refill:  0    acetaminophen (TYLENOL) 500 MG tablet     Sig: Take 2 tablets by mouth 3 times daily     Dispense:  30 tablet     Refill:  1     DIAGNOSTIC RESULTS / EMERGENCY DEPARTMENT COURSE / MDM     LABS:  No results found for this visit on 02/19/21. IMPRESSION/MDM/ED COURSE:  60 y.o. male presented with left ankle pain after hyper plantarflexion during a fall prior to arrival.  Now unable to bear weight. Slightly hypertensive and tachycardic on arrival, will treat pain and recheck. Patient is in no acute distress. There is an area of swelling and tenderness of the lateral fibula just proximal to lateral malleolus. Pain with inversion of the foot. Positive squeeze test.  Concern for fracture versus high ankle sprain. Neurovascularly intact. No ligamentous instability. Will obtain x-ray. ED Course as of Feb 19 2019 Fri Feb 19, 2021   1533 IMPRESSION:  Acute, cortex width posterolateral displaced transverse fracture distal left  fibular metadiaphysis.     Soft tissue edema, presumably reactive edema, contusion and/or sprain.    [AF]   1700 Splint applied, please see procedure note for details. Brisk cap refill and sensation intact post splinting. Discussed splint care. Tylenol and ibuprofen prescribed for pain. Follow-up with orthopedics next week. Weight-bear as tolerated. Patient has crutches that he came in with. I discussed signs and symptoms that would require reevaluation in the ED. The patient expressed understanding and agreement with plan. All questions answered. [AF]      ED Course User Index  [AF] Zack Skinner DO     Patient/Guardian requesting discharge. Patient/Guardian was given written and verbal instructions prior to discharge. Patient/Guardian understood and agreed. Patient/Guardian had no further questions. RADIOLOGY:  XR ANKLE LEFT (MIN 3 VIEWS)   Final Result   Acute, cortex width posterolateral displaced transverse fracture distal left   fibular metadiaphysis. Soft tissue edema, presumably reactive edema, contusion and/or sprain. XR TIBIA FIBULA LEFT (2 VIEWS)   Final Result   Nondisplaced distal fibula fracture. EKG  None    All EKG's are interpreted by the Emergency Department Physician who either signs or Co-signs this chart in the absence of a cardiologist.    PROCEDURES:  PROCEDURE NOTE -splint application    PATIENT NAME: [facility] NO. [id number]  DATE: 2/19/2021  ATTENDING PHYSICIAN: Dr. Joey Phelps DIAGNOSIS: Fibula fracture   POSTOPERATIVE DIAGNOSIS:  Same  PROCEDURE PERFORMED: Left you leg splint   PERFORMING PHYSICIAN: Rodriguez Feliz DO    DISCUSSION:  Jareth Moulton is a 60y.o.-year-old male who requires splint of the  left leg due to fibula fx. The history and physical examination were reviewed and confirmed. CONSENT: The patient provided verbal consent for this procedure. PROCEDURE: Sugar tong and short posterior leg splint was applied using orthoglass. Post procedure distal sensation was intact and cap refill brisk. The patient tolerated the procedure well. COMPLICATIONS:  None     Rodriguez Feliz DO  8:17 PM, 2/19/21    CONSULTS:  None    FINAL IMPRESSION      1.  Closed fracture of left tibia and fibula, initial encounter        DISPOSITION / PLAN     DISPOSITION        PATIENT REFERREDTO:  Darrell VALENTINO  1540  [id number] [phone]  Schedule an appointment as soon as possible for a visit in 1 week        DISCHARGE MEDICATIONS:  Discharge Medication List as of 2/19/2021  5:06 PM      START taking these medications    Details   ibuprofen (ADVIL;MOTRIN) 800 MG tablet Take 1 tablet by mouth 2 times daily as needed for Pain, Disp-30 tablet, R-0Print      acetaminophen (TYLENOL) 500 MG tablet Take 2 tablets by mouth 3 times daily, Disp-30 tablet, R-1Print             Yazan Mathis DO  PGY 1  Resident Physician Emergency Medicine  02/19/21 8:17 PM    (Please note that portions of this note were completed with a voice recognition program.Efforts were made to edit the dictations but occasionally words are mis-transcribed.)       Matthias Zamorano DO  Resident  02/19/21 2019

## 2021-02-23 ENCOUNTER — OFFICE VISIT (OUTPATIENT)
Dept: ORTHOPEDIC SURGERY | Age: 56
End: 2021-02-23
Payer: COMMERCIAL

## 2021-02-23 VITALS — RESPIRATION RATE: 12 BRPM | BODY MASS INDEX: 27.78 KG/M2 | WEIGHT: 177 LBS | HEIGHT: 67 IN

## 2021-02-23 DIAGNOSIS — S82.65XA CLOSED NONDISPLACED FRACTURE OF LATERAL MALLEOLUS OF LEFT FIBULA, INITIAL ENCOUNTER: Primary | ICD-10-CM

## 2021-02-23 DIAGNOSIS — M25.572 LEFT ANKLE PAIN, UNSPECIFIED CHRONICITY: Primary | ICD-10-CM

## 2021-02-23 PROCEDURE — G8427 DOCREV CUR MEDS BY ELIG CLIN: HCPCS | Performed by: ORTHOPAEDIC SURGERY

## 2021-02-23 PROCEDURE — G8484 FLU IMMUNIZE NO ADMIN: HCPCS | Performed by: ORTHOPAEDIC SURGERY

## 2021-02-23 PROCEDURE — 99204 OFFICE O/P NEW MOD 45 MIN: CPT | Performed by: ORTHOPAEDIC SURGERY

## 2021-02-23 PROCEDURE — 4004F PT TOBACCO SCREEN RCVD TLK: CPT | Performed by: ORTHOPAEDIC SURGERY

## 2021-02-23 PROCEDURE — 3017F COLORECTAL CA SCREEN DOC REV: CPT | Performed by: ORTHOPAEDIC SURGERY

## 2021-02-23 PROCEDURE — G8419 CALC BMI OUT NRM PARAM NOF/U: HCPCS | Performed by: ORTHOPAEDIC SURGERY

## 2021-02-23 NOTE — PROGRESS NOTES
[facility]  [place] [id number] [place] [address]  Dept: [phone]    Ambulatory Orthopedic Consult      CHIEF COMPLAINT:    Chief Complaint   Patient presents with    Ankle Pain     left ankle       HISTORY OF PRESENT ILLNESS:      The patient is a 60 y.o. male who is being seen for evaluation of pain at the above location at the ankle, secondary to an injury that occurred 2/19/2021 secondary to a twisting injury at home. The pain is described mainly with mechanical terms (dull/sharp/throbbing). The pain is worse with activity and better with rest. The patient reports an associated swelling. REVIEW OF SYSTEMS:  Constitutional: Negative for fever. HENT: Negative for tinnitus. Eyes: Negative for pain. Respiratory: Negative for shortness of breath. Cardiovascular: Negative for chest pain. Gastrointestinal: Negative for abdominal pain. Genitourinary: Negative for dysuria. Skin: Negative for rash. Neurological: Negative for headaches. Hematological: Does not bruise/bleed easily. Musculoskeletal: See HPI for pertinent positives     Past Medical History:    He  has a past medical history of Hypertension. Past Surgical History:    He  has a past surgical history that includes [place] tooth extraction.      Current Medications:     Current Outpatient Medications:     ibuprofen (ADVIL;MOTRIN) 800 MG tablet, Take 1 tablet by mouth 2 times daily as needed for Pain, Disp: 30 tablet, Rfl: 0    acetaminophen (TYLENOL) 500 MG tablet, Take 2 tablets by mouth 3 times daily, Disp: 30 tablet, Rfl: 1    amLODIPine (NORVASC) 5 MG tablet, take 1 tablet by mouth once daily, Disp: 90 tablet, Rfl: 0    simvastatin (ZOCOR) 20 MG tablet, take 1 tablet by mouth nightly, Disp: 90 tablet, Rfl: 0    lisinopril (PRINIVIL;ZESTRIL) 20 MG tablet, take 1 tablet by mouth once daily, Disp: 90 tablet, Rfl: 0    Blood Pressure Monitoring (BLOOD PRESSURE CUFF) MISC, 1 Device by Does not apply route once for 1 dose, Disp: 1 each, Rfl: 0     Allergies:    Patient has no known allergies. Family History:  family history includes Heart Disease (age of onset: 80) in his mother. 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 Skin:  Intact over foot/ankle, without rash/lesions/AV malformations. Motion: Able to wiggle toes   -Range of motion not tested due to pain  -No tenderness at knee   -Tenderness to palpation: Ankle diffusely  -Negative squeeze test for syndesmotic injury      RADIOLOGY:   2/23/2021 FINDINGS:  Three weightbearing views (AP, Mortise, and Lateral) of the left ankle and three weightbearing views (AP, Oblique, Lateral) of the left foot were obtained in the office today and reviewed, revealing lateral malleolus spiral fracture with no displacement, without medial tibiotalar clear space widening, without syndesmotic widening. IMPRESSION: Ankle fracture as above.      Electronically signed by Hanna Duarte MD Eddie      No results found. ASSESSMENT AND PLAN:  Body mass index is 27.72 kg/m². He has a nondisplaced left lateral malleolus fracture, sustained on 2/19/2021. Notably, he has the past medical history as above. He has a history of tobacco use (reports he smokes 1/2 pack of cigarettes per day). I had a long discussion today with the patient about the likely diagnosis and its natural history, physical exam and imaging findings, as well as various treatment options in detail. Surgically, we discussed operative fixation. We discussed both surgical and nonsurgical treatments, including risks and benefits. As a result of our discussion, the patient was able to make an informed decision, and has elected to proceed with nonoperative management. We did discuss the risks of delayed union, nonunion, arthritis, as well as the risks of smoking on healing and DVT/PE. Orders/referrals were placed as below at today's visit. The patient will avoid the routine use of NSAIDs to prevent the theoretical risk of delayed healing. The patient will avoid pain provoking activity. The patient will use an Ace wrap and a CAM boot for pain control and stabilization while ambulatory. He may be weightbearing as tolerated in the cam boot while using crutches as needed. We also had a discussion about the risk of blood clot and thromboembolic events. The patient understands that there is an increased risk with surgery/immobilization, and understands nothing will completely eliminate the risk of DVT/PE's, and that any prophylactic medication does not substitute for early mobilization. Given his above risk profile, I have recommended the following strategy to decrease the risk of blood clots, and the patient agrees and wishes to proceed:  Aspirin 325 mg PO q Day. All questions were answered and the above plan was agreed upon. The patient will return to clinic in 1 month with repeat ankle x-rays. At the patient's next visit, depending on how the patient is doing and/or new imaging/labs results, we may consider the following options:    []  Orthotic (OTC)     []  Orthotic (custom)          []  Rocker bottom shoes     []  Brace (OTC)        []  Brace (custom)             []  CAM boot        []  Night splint         []  Heel cups        []  Strap      []  Toe sleeves/splints    []  PT:                     []  Wean out of immobilization   []  Advance activity       []  Topical               []  NSAIDs          []  Sherron         []  Referral:         []  Stress xrays       []  CT         []  MRI        []  Injection:         []  Consider OR      []  Pick OR date    Return in about 4 weeks (around 3/23/2021). No orders of the defined types were placed in this encounter. No orders of the defined types were placed in this encounter. Satinder Torres MD  Orthopedic Surgery        Please excuse any typos/errors, as this note was created with the assistance of voice recognition software. While intending to generate a document that actually reflects the content of the visit, the document can still have some errors including those of syntax and sound-a-like substitutions which may escape proof reading. In such instances, actual meaning can be extrapolated by context.

## 2021-02-23 NOTE — LETTER
Dr. Cliff Barrett  [address]  [phone]        2/23/2021     Patient: Humaira Calloway  YOB: 1965    Dear TARSHA Gaffney CNP,    I had the pleasure of seeing one of your patients, Humaira Calloway recently in the office. Below are the relevant portions of my assessment and plan of care. ASSESSMENT AND PLAN:  He has a nondisplaced left lateral malleolus fracture, sustained on 2/19/2021. Notably, he has the past medical history as above. He has a history of tobacco use (reports he smokes 1/2 pack of cigarettes per day). I had a long discussion today with the patient about the likely diagnosis and its natural history, physical exam and imaging findings, as well as various treatment options in detail. Surgically, we discussed operative fixation. We discussed both surgical and nonsurgical treatments, including risks and benefits. As a result of our discussion, the patient was able to make an informed decision, and has elected to proceed with nonoperative management. We did discuss the risks of delayed union, nonunion, arthritis, as well as the risks of smoking on healing and DVT/PE. Orders/referrals were placed as below at today's visit. The patient will avoid the routine use of NSAIDs to prevent the theoretical risk of delayed healing. The patient will avoid pain provoking activity. The patient will use an Ace wrap and a CAM boot for pain control and stabilization while ambulatory. He may be weightbearing as tolerated in the cam boot while using crutches as needed. We also had a discussion about the risk of blood clot and thromboembolic events. The patient understands that there is an increased risk with surgery/immobilization, and understands nothing will completely eliminate the risk of DVT/PE's, and that any prophylactic medication does not substitute for early mobilization. Given his above risk profile, I have recommended the following strategy to decrease the risk of blood clots, and the patient agrees and wishes to proceed:  Aspirin 325 mg PO q Day. All questions were answered and the above plan was agreed upon. The patient will return to clinic in 1 month with repeat ankle x-rays. I look forward to serving you and your patients again in the future. Please don't hesitate to contact me at my mobile number .         Terry Cramer MD  Orthopedic Surgery

## 2021-02-24 ENCOUNTER — TELEPHONE (OUTPATIENT)
Dept: ORTHOPEDIC SURGERY | Age: 56
End: 2021-02-24

## 2021-02-24 DIAGNOSIS — S82.65XA CLOSED NONDISPLACED FRACTURE OF LATERAL MALLEOLUS OF LEFT FIBULA, INITIAL ENCOUNTER: Primary | ICD-10-CM

## 2021-02-24 RX ORDER — ASPIRIN 325 MG
325 TABLET, DELAYED RELEASE (ENTERIC COATED) ORAL DAILY
Qty: 42 TABLET | Refills: 0 | Status: SHIPPED | OUTPATIENT
Start: 2021-02-24 | End: 2021-06-10

## 2021-02-24 NOTE — TELEPHONE ENCOUNTER
Patient seen by Dr. Madhavi Villa on 2/23/21 and was told he would be prescribed a blood thinner. States he checked with his Pharmacy which is [facility] [address] and they do not have a medication for him.     He is requesting a call back at [phone]

## 2021-03-17 DIAGNOSIS — S82.65XA CLOSED NONDISPLACED FRACTURE OF LATERAL MALLEOLUS OF LEFT FIBULA, INITIAL ENCOUNTER: Primary | ICD-10-CM

## 2021-03-23 ENCOUNTER — OFFICE VISIT (OUTPATIENT)
Dept: ORTHOPEDIC SURGERY | Age: 56
End: 2021-03-23
Payer: COMMERCIAL

## 2021-03-23 VITALS — HEIGHT: 67 IN | RESPIRATION RATE: 12 BRPM | WEIGHT: 177 LBS | TEMPERATURE: 97.6 F | BODY MASS INDEX: 27.78 KG/M2

## 2021-03-23 DIAGNOSIS — S82.65XD CLOSED NONDISPLACED FRACTURE OF LATERAL MALLEOLUS OF LEFT FIBULA WITH ROUTINE HEALING, SUBSEQUENT ENCOUNTER: Primary | ICD-10-CM

## 2021-03-23 PROCEDURE — G8427 DOCREV CUR MEDS BY ELIG CLIN: HCPCS | Performed by: ORTHOPAEDIC SURGERY

## 2021-03-23 PROCEDURE — G8419 CALC BMI OUT NRM PARAM NOF/U: HCPCS | Performed by: ORTHOPAEDIC SURGERY

## 2021-03-23 PROCEDURE — G8484 FLU IMMUNIZE NO ADMIN: HCPCS | Performed by: ORTHOPAEDIC SURGERY

## 2021-03-23 PROCEDURE — 99213 OFFICE O/P EST LOW 20 MIN: CPT | Performed by: ORTHOPAEDIC SURGERY

## 2021-03-23 PROCEDURE — 4004F PT TOBACCO SCREEN RCVD TLK: CPT | Performed by: ORTHOPAEDIC SURGERY

## 2021-03-23 PROCEDURE — 3017F COLORECTAL CA SCREEN DOC REV: CPT | Performed by: ORTHOPAEDIC SURGERY

## 2021-03-23 RX ORDER — ASPIRIN 325 MG
325 TABLET, DELAYED RELEASE (ENTERIC COATED) ORAL DAILY
Qty: 42 TABLET | Refills: 0 | Status: SHIPPED | OUTPATIENT
Start: 2021-03-23 | End: 2021-04-20 | Stop reason: SDUPTHER

## 2021-03-23 NOTE — PROGRESS NOTES
[facility]  [place] [id number]  Dept: [phone]    Ambulatory Orthopedic Consult      CHIEF COMPLAINT:    Chief Complaint   Patient presents with    Ankle Pain     left ankle       HISTORY OF PRESENT ILLNESS:      The patient is a 60 y.o. male who is being seen for evaluation of pain at the above location at the ankle, secondary to an injury that occurred 2/19/2021 secondary to a twisting injury at home. The pain is described mainly with mechanical terms (dull/sharp/throbbing). The pain is worse with activity and better with rest. The patient reports an associated swelling. INTERVAL HISTORY 3/23/2021:  He is seen again today in the office for follow up of a previous issue (as above). Since being seen last, the patient is doing better. He is ambulating today using a CAM boot. The location and quality of the pain have not significantly changed since the last visit. REVIEW OF SYSTEMS:  Constitutional: Negative for fever. HENT: Negative for tinnitus. Eyes: Negative for pain. Respiratory: Negative for shortness of breath. Cardiovascular: Negative for chest pain. Gastrointestinal: Negative for abdominal pain. Genitourinary: Negative for dysuria. Skin: Negative for rash. Neurological: Negative for headaches. Hematological: Does not bruise/bleed easily. Musculoskeletal: See HPI for pertinent positives     Past Medical History:    He  has a past medical history of Hypertension. Past Surgical History:    He  has a past surgical history that includes [place] tooth extraction.      Current Medications:     Current Outpatient Medications:     aspirin 325 MG EC tablet, Take 1 tablet by mouth daily, Disp: 42 tablet, Rfl: 0    ibuprofen (ADVIL;MOTRIN) 800 MG tablet, Take 1 tablet by mouth 2 times daily as needed for Pain, Disp: 30 tablet, Rfl: 0    acetaminophen (TYLENOL) 500 MG tablet, Take 2 weightbearing views (AP, Mortise, and Lateral) of the left ankle were obtained in the office today and reviewed, revealing lateral malleolus spiral fracture with no displacement, without medial tibiotalar clear space widening, without syndesmotic widening. Interval healing noted without interval displacement. IMPRESSION: Ankle fracture as above. Electronically signed by Yinka Sears MD      ASSESSMENT AND PLAN:  Body mass index is 27.72 kg/m². He has a nondisplaced left lateral malleolus fracture, sustained on 2/19/2021. He is doing well overall with conservative management. Notably, he has the past medical history as above. He has a history of tobacco use (reports he smokes 1/2 pack of cigarettes per day). I had another discussion today with the patient about the likely diagnosis and its natural history, physical exam and imaging findings, as well as various treatment options in detail. Surgically, we discussed operative fixation at this point, as well as possible future surgery, depending on his healing. At today's visit, I did not recommend surgical intervention, and did recommend continuing conservative management, as he is doing better, and does have good evidence of healing radiographically. We have discussed the risks of delayed union, nonunion, arthritis, as well as the risks of smoking on healing and DVT/PE. Orders/referrals were placed as below at today's visit. We discussed he will continue to ambulate weightbearing as tolerated in the cam boot, and continue to avoid pain provoking activity. I recommended to continue avoiding the routine use of NSAIDs. He was ordered more aspirin 325 mg p.o. daily for DVT prophylaxis. All questions were answered and the above plan was agreed upon. The patient will return to clinic in 4 weeks with repeat ankle x-rays.   At his next visit, we will consider weaning him out of the cam boot into a lace up ankle brace and possible

## 2021-04-14 DIAGNOSIS — M25.572 LEFT ANKLE PAIN, UNSPECIFIED CHRONICITY: Primary | ICD-10-CM

## 2021-04-20 ENCOUNTER — OFFICE VISIT (OUTPATIENT)
Dept: ORTHOPEDIC SURGERY | Age: 56
End: 2021-04-20
Payer: COMMERCIAL

## 2021-04-20 VITALS — RESPIRATION RATE: 12 BRPM | BODY MASS INDEX: 27.78 KG/M2 | HEIGHT: 67 IN | WEIGHT: 177 LBS | TEMPERATURE: 98.2 F

## 2021-04-20 DIAGNOSIS — S82.65XD CLOSED NONDISPLACED FRACTURE OF LATERAL MALLEOLUS OF LEFT FIBULA WITH ROUTINE HEALING, SUBSEQUENT ENCOUNTER: Primary | ICD-10-CM

## 2021-04-20 PROCEDURE — 3017F COLORECTAL CA SCREEN DOC REV: CPT | Performed by: ORTHOPAEDIC SURGERY

## 2021-04-20 PROCEDURE — G8419 CALC BMI OUT NRM PARAM NOF/U: HCPCS | Performed by: ORTHOPAEDIC SURGERY

## 2021-04-20 PROCEDURE — 4004F PT TOBACCO SCREEN RCVD TLK: CPT | Performed by: ORTHOPAEDIC SURGERY

## 2021-04-20 PROCEDURE — 99213 OFFICE O/P EST LOW 20 MIN: CPT | Performed by: ORTHOPAEDIC SURGERY

## 2021-04-20 PROCEDURE — G8427 DOCREV CUR MEDS BY ELIG CLIN: HCPCS | Performed by: ORTHOPAEDIC SURGERY

## 2021-04-20 RX ORDER — ASPIRIN 325 MG
325 TABLET, DELAYED RELEASE (ENTERIC COATED) ORAL DAILY
Qty: 14 TABLET | Refills: 0 | Status: SHIPPED | OUTPATIENT
Start: 2021-04-20 | End: 2021-06-10

## 2021-04-20 NOTE — PROGRESS NOTES
[facility]  [facility]  [facility] [place] [id number]  Dept: [phone]    Ambulatory Orthopedic Consult      CHIEF COMPLAINT:    Chief Complaint   Patient presents with    Ankle Pain     left ankle       HISTORY OF PRESENT ILLNESS:      The patient is a 60 y.o. male who is being seen for evaluation of pain at the above location at the ankle, secondary to an injury that occurred 2/19/2021 secondary to a twisting injury at home. The pain is described mainly with mechanical terms (dull/sharp/throbbing). The pain is worse with activity and better with rest. The patient reports an associated swelling. INTERVAL HISTORY 3/23/2021:  He is seen again today in the office for follow up of a previous issue (as above). Since being seen last, the patient is doing better. He is ambulating today using a CAM boot. The location and quality of the pain have not significantly changed since the last visit. INTERVAL HISTORY 4/20/2021:  He is seen again today in the office for follow up of a previous issue (as above). Since being seen last, the patient is doing better. He is ambulating today using a CAM boot. The location and quality of the pain have not significantly changed since the last visit. REVIEW OF SYSTEMS:  Constitutional: Negative for fever. HENT: Negative for tinnitus. Eyes: Negative for pain. Respiratory: Negative for shortness of breath. Cardiovascular: Negative for chest pain. Gastrointestinal: Negative for abdominal pain. Genitourinary: Negative for dysuria. Skin: Negative for rash. Neurological: Negative for headaches. Hematological: Does not bruise/bleed easily. Musculoskeletal: See HPI for pertinent positives     Past Medical History:    He  has a past medical history of Hypertension. Past Surgical History:    He  has a past surgical history that includes [place] tooth extraction.      Current Medications: Current Outpatient Medications:     aspirin 325 MG EC tablet, Take 1 tablet by mouth daily, Disp: 42 tablet, Rfl: 0    aspirin 325 MG EC tablet, Take 1 tablet by mouth daily, Disp: 42 tablet, Rfl: 0    ibuprofen (ADVIL;MOTRIN) 800 MG tablet, Take 1 tablet by mouth 2 times daily as needed for Pain, Disp: 30 tablet, Rfl: 0    acetaminophen (TYLENOL) 500 MG tablet, Take 2 tablets by mouth 3 times daily, Disp: 30 tablet, Rfl: 1    amLODIPine (NORVASC) 5 MG tablet, take 1 tablet by mouth once daily, Disp: 90 tablet, Rfl: 0    simvastatin (ZOCOR) 20 MG tablet, take 1 tablet by mouth nightly, Disp: 90 tablet, Rfl: 0    lisinopril (PRINIVIL;ZESTRIL) 20 MG tablet, take 1 tablet by mouth once daily, Disp: 90 tablet, Rfl: 0    Blood Pressure Monitoring (BLOOD PRESSURE CUFF) MISC, 1 Device by Does not apply route once for 1 dose, Disp: 1 each, Rfl: 0     Allergies:    Patient has no known allergies. Family History:  family history includes Heart Disease (age of onset: 80) in his mother. 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 He was referred to physical therapy for general strengthening. All questions were answered and the above plan was agreed upon. The patient will return to clinic in 6 weeks with repeat ankle x-rays. At his next visit, I anticipate progressing his activity further. At the patient's next visit, depending on how the patient is doing and/or new imaging/labs results, we may consider the following options:    []  Orthotic (OTC)     []  Orthotic (custom)          []  Rocker bottom shoes     []  Brace (OTC)        []  Brace (custom)             []  CAM boot        []  Night splint         []  Heel cups        []  Strap      []  Toe sleeves/splints    []  PT:                     []  Wean out of immobilization   []  Advance activity       []  Topical               []  NSAIDs          []  Sherron         []  Referral:         []  Stress xrays       []  CT         []  MRI        []  Injection:         []  Consider OR      []  Pick OR date    Return in about 6 weeks (around 6/1/2021). No orders of the defined types were placed in this encounter. No orders of the defined types were placed in this encounter. Dalia Scott MD  Orthopedic Surgery        Please excuse any typos/errors, as this note was created with the assistance of voice recognition software. While intending to generate a document that actually reflects the content of the visit, the document can still have some errors including those of syntax and sound-a-like substitutions which may escape proof reading. In such instances, actual meaning can be extrapolated by context.

## 2021-05-10 DIAGNOSIS — E78.2 MIXED HYPERLIPIDEMIA: ICD-10-CM

## 2021-05-10 DIAGNOSIS — I10 ESSENTIAL HYPERTENSION: ICD-10-CM

## 2021-05-10 RX ORDER — LISINOPRIL 20 MG/1
TABLET ORAL
Qty: 30 TABLET | Refills: 0 | Status: SHIPPED | OUTPATIENT
Start: 2021-05-10 | End: 2021-06-10 | Stop reason: SDUPTHER

## 2021-05-10 RX ORDER — SIMVASTATIN 20 MG
20 TABLET ORAL NIGHTLY
Qty: 30 TABLET | Refills: 0 | Status: SHIPPED | OUTPATIENT
Start: 2021-05-10 | End: 2021-06-10 | Stop reason: SDUPTHER

## 2021-05-10 RX ORDER — AMLODIPINE BESYLATE 5 MG/1
TABLET ORAL
Qty: 30 TABLET | Refills: 0 | Status: SHIPPED | OUTPATIENT
Start: 2021-05-10 | End: 2021-06-10 | Stop reason: SDUPTHER

## 2021-05-10 NOTE — TELEPHONE ENCOUNTER
Last visit: 8/3/2020  Last Med refill: 2/4/2021  Does patient have enough medication for 72 hours: No:     Next Visit Date:  Pt is doing DOXY due to fracture of LT tibia and fibula  Future Appointments   Date Time Provider Carlton Granados   5/13/2021  8:30 AM TARSHA Perez CNP [facility]   6/1/2021 10:00 AM MD Soren Linares [address] Maintenance   Topic Date Due    Hepatitis C screen  Never done    HIV screen  Never done    COVID-19 Vaccine (1) Never done    Colon cancer screen colonoscopy  Never done    Lipid screen  12/04/2019    Potassium monitoring  12/04/2019    Creatinine monitoring  12/04/2019    Shingles Vaccine (1 of 2) 08/03/2021 (Originally 2/25/2013)    A1C test (Diabetic or Prediabetic)  08/03/2021    DTaP/Tdap/Td vaccine (2 - Td) 05/18/2027    Flu vaccine  Completed    Pneumococcal 0-64 years Vaccine  Completed    Hepatitis A vaccine  Aged Out    Hepatitis B vaccine  Aged Out    Hib vaccine  Aged Out    Meningococcal (ACWY) vaccine  Aged Out       Hemoglobin A1C (%)   Date Value   08/03/2020 5.7   11/02/2015 5.4             ( goal A1C is < 7)   No results found for: LABMICR  LDL Cholesterol (mg/dL)   Date Value   12/04/2018 112   11/02/2015 98       (goal LDL is <100)   AST (U/L)   Date Value   12/04/2018 29     ALT (U/L)   Date Value   12/04/2018 41     BUN (mg/dL)   Date Value   12/04/2018 20     BP Readings from Last 3 Encounters:   02/19/21 (!) 141/94   08/03/20 122/78   09/10/19 128/83          (goal 120/80)    All Future Testing planned in [facility]  Lab Frequency Next Occurrence               Patient Active Problem List:     Chest pain     Tobacco abuse     Hypertension     Mixed hyperlipidemia

## 2021-05-19 DIAGNOSIS — M25.572 ACUTE LEFT ANKLE PAIN: Primary | ICD-10-CM

## 2021-06-10 ENCOUNTER — TELEMEDICINE (OUTPATIENT)
Dept: FAMILY MEDICINE CLINIC | Age: 56
End: 2021-06-10
Payer: COMMERCIAL

## 2021-06-10 DIAGNOSIS — I10 ESSENTIAL HYPERTENSION: ICD-10-CM

## 2021-06-10 DIAGNOSIS — Z12.12 ENCOUNTER FOR SCREENING FOR COLORECTAL MALIGNANT NEOPLASM: Primary | ICD-10-CM

## 2021-06-10 DIAGNOSIS — E78.2 MIXED HYPERLIPIDEMIA: ICD-10-CM

## 2021-06-10 DIAGNOSIS — R73.09 ELEVATED HEMOGLOBIN A1C: ICD-10-CM

## 2021-06-10 DIAGNOSIS — Z12.5 SCREENING FOR MALIGNANT NEOPLASM OF PROSTATE: ICD-10-CM

## 2021-06-10 DIAGNOSIS — Z12.11 ENCOUNTER FOR SCREENING FOR COLORECTAL MALIGNANT NEOPLASM: Primary | ICD-10-CM

## 2021-06-10 PROCEDURE — 99213 OFFICE O/P EST LOW 20 MIN: CPT | Performed by: NURSE PRACTITIONER

## 2021-06-10 PROCEDURE — G8427 DOCREV CUR MEDS BY ELIG CLIN: HCPCS | Performed by: NURSE PRACTITIONER

## 2021-06-10 PROCEDURE — 3017F COLORECTAL CA SCREEN DOC REV: CPT | Performed by: NURSE PRACTITIONER

## 2021-06-10 RX ORDER — AMLODIPINE BESYLATE 5 MG/1
TABLET ORAL
Qty: 90 TABLET | Refills: 1 | Status: SHIPPED | OUTPATIENT
Start: 2021-06-10 | End: 2021-11-29

## 2021-06-10 RX ORDER — LISINOPRIL 20 MG/1
TABLET ORAL
Qty: 90 TABLET | Refills: 1 | Status: SHIPPED | OUTPATIENT
Start: 2021-06-10 | End: 2021-11-29

## 2021-06-10 RX ORDER — SIMVASTATIN 20 MG
20 TABLET ORAL NIGHTLY
Qty: 90 TABLET | Refills: 1 | Status: SHIPPED | OUTPATIENT
Start: 2021-06-10 | End: 2021-11-29

## 2021-06-10 SDOH — ECONOMIC STABILITY: FOOD INSECURITY: WITHIN THE PAST 12 MONTHS, THE FOOD YOU BOUGHT JUST DIDN'T LAST AND YOU DIDN'T HAVE MONEY TO GET MORE.: NEVER TRUE

## 2021-06-10 SDOH — ECONOMIC STABILITY: FOOD INSECURITY: WITHIN THE PAST 12 MONTHS, YOU WORRIED THAT YOUR FOOD WOULD RUN OUT BEFORE YOU GOT MONEY TO BUY MORE.: NEVER TRUE

## 2021-06-10 ASSESSMENT — PATIENT HEALTH QUESTIONNAIRE - PHQ9
SUM OF ALL RESPONSES TO PHQ9 QUESTIONS 1 & 2: 0
SUM OF ALL RESPONSES TO PHQ QUESTIONS 1-9: 0
SUM OF ALL RESPONSES TO PHQ QUESTIONS 1-9: 0
1. LITTLE INTEREST OR PLEASURE IN DOING THINGS: 0
SUM OF ALL RESPONSES TO PHQ QUESTIONS 1-9: 0
2. FEELING DOWN, DEPRESSED OR HOPELESS: 0

## 2021-06-10 ASSESSMENT — SOCIAL DETERMINANTS OF HEALTH (SDOH): HOW HARD IS IT FOR YOU TO PAY FOR THE VERY BASICS LIKE FOOD, HOUSING, MEDICAL CARE, AND HEATING?: NOT HARD AT ALL

## 2021-06-10 ASSESSMENT — ENCOUNTER SYMPTOMS
SHORTNESS OF BREATH: 0
COUGH: 0

## 2021-06-10 NOTE — PROGRESS NOTES
VISIT LOCATION: Home    TELEHEALTH EVALUATION -- Audio/Visual (During ZHCWT-48 public health emergency)    Due to COVID 23 outbreak, patient's office visit was converted to a virtual visit. Patient was contacted and agreed to proceed with a virtual visit via [employer]. me  The risks and benefits of converting to a virtual visit were discussed in light of the current infectious disease epidemic. Patient also understood that insurance coverage and co-pays are up to their individual insurance plans. Gurdeep Quan (:  1965) has requested an audio/video evaluation for the following concern(s):    Chief Complaint:   HPI:  Hypertension:  Home blood pressure monitoring: No.  He is adherent to a low sodium diet. Patient denies chest pain, shortness of breath, headache, lightheadedness, blurred vision and peripheral edema. Antihypertensive medication side effects: no medication side effects noted. Use of agents associated with hypertension: none. Sodium (mmol/L)   Date Value   2018 139    BUN (mg/dL)   Date Value   2018 20    Glucose (mg/dL)   Date Value   2018 105 (H)      Potassium (mmol/L)   Date Value   2018 4.4    CREATININE (mg/dL)   Date Value   2018 0.85           Review of Systems   Constitutional: Negative for chills and fever. Respiratory: Negative for cough and shortness of breath. Cardiovascular: Negative for chest pain and leg swelling. Prior to Visit Medications    Medication Sig Taking?  Authorizing Provider   lisinopril (PRINIVIL;ZESTRIL) 20 MG tablet Take 1 tablet by mouth once daily Yes TARSHA Waller CNP   simvastatin (ZOCOR) 20 MG tablet Take 1 tablet by mouth nightly Yes TARSHA Waller CNP   amLODIPine (NORVASC) 5 MG tablet Take 1 tablet by mouth once daily Yes TARSHA Waller CNP   aspirin 325 MG EC tablet Take 1 tablet by mouth daily for 14 days  Patient not taking: Reported on 6/10/2021  Machelle Be MD   aspirin 325 MG EC tablet Take 1 tablet by mouth daily  Patient not taking: Reported on 6/10/2021  Machelle Be MD   ibuprofen (ADVIL;MOTRIN) 800 MG tablet Take 1 tablet by mouth 2 times daily as needed for Pain  Patient not taking: Reported on 6/10/2021  Isaac Ragsdale DO   acetaminophen (TYLENOL) 500 MG tablet Take 2 tablets by mouth 3 times daily  Patient not taking: Reported on 6/10/2021  Isaac Ragsdale DO   Blood Pressure Monitoring (BLOOD PRESSURE CUFF) MISC 1 Device by Does not apply route once for 1 dose  Patient not taking: Reported on 6/10/2021  TARSHA English - CNP     Social- patient smokes    Past Medical History:   Diagnosis Date    Hypertension    ,   Past Surgical History:   Procedure Laterality Date    WISDOM TOOTH EXTRACTION      only 2 taken out             [] OTHER:    Constitutional: [x] Appears well-developed and well-nourished [] No apparent distress                            [] Abnormal-   Mental status  [x] Alert and awake  [x] Oriented to person/place/time [x]Able to follow commands       Eyes:  EOM    [x]  Normal  [] Abnormal-  Sclera  [x]  Normal  [] Abnormal -         Discharge [x]  None visible  [] Abnormal -     HENT:   [x] Normocephalic, atraumatic.   [] Abnormal   [] Mouth/Throat: Mucous membranes are moist.      External Ears [x] Normal  [] Abnormal-      Neck: [x] No visualized mass      Pulmonary/Chest: [x] Respiratory effort normal.  [] No visualized signs of difficulty breathing or respiratory distress        [] Abnormal-      Musculoskeletal:   [] Normal gait with no signs of ataxia         [x] Normal range of motion of neck        [] Abnormal-   [] Motor grossly intact in visible upper extremities    [] Motor grossly intact in visible lower extremities        Neurological:        [x] No Facial Asymmetry (Cranial nerve 7 motor function) (limited exam to video visit)                       [x] No gaze palsy [] Abnormal-         Skin:                     [x] No significant exanthematous lesions or discoloration noted on facial skin         [] Abnormal-                                  Psychiatric:           [x] Normal Affect [] No Hallucinations        [] Abnormal- [] Normal Mood  [] Anxious appearing    [] Depressed appearing  [] Confused appearing      Due to this being a TeleHealth encounter, evaluation of the following organ systems is limited: Vitals/Constitutional/EENT/Resp/CV/GI//MS/Neuro/Skin/Heme-Lymph-Imm. ASSESSMENT/PLAN:  Encounter Diagnoses   Name Primary?  Essential hypertension     Mixed hyperlipidemia     Encounter for screening for colorectal malignant neoplasm Yes     Most recent bp was mildly elevated but he was in the er for a broken leg. He will come for lab work and bring covid card as he is up to date on box vaccines  No orders of the defined types were placed in this encounter. No follow-ups on file. An  electronic signature was used to authenticate this note. --TARSHA Escobar CNP on 6/10/2021 at 1:22 PM        Pursuant to the emergency declaration under the [facility] waiver authority and the [employer] Act, this Virtual  Visit was conducted, with patient's consent, to reduce the patient's risk of exposure to COVID-19 and provide continuity of care for an established patient. Services were provided through a telephone discussion virtually to substitute for in-person clinic visit.

## 2021-06-18 ENCOUNTER — OFFICE VISIT (OUTPATIENT)
Dept: ORTHOPEDIC SURGERY | Age: 56
End: 2021-06-18
Payer: COMMERCIAL

## 2021-06-18 VITALS — BODY MASS INDEX: 27.78 KG/M2 | RESPIRATION RATE: 12 BRPM | WEIGHT: 177 LBS | HEIGHT: 67 IN

## 2021-06-18 DIAGNOSIS — S82.65XG CLOSED NONDISPLACED FRACTURE OF LATERAL MALLEOLUS OF LEFT FIBULA WITH DELAYED HEALING, SUBSEQUENT ENCOUNTER: Primary | ICD-10-CM

## 2021-06-18 DIAGNOSIS — S82.65XD CLOSED NONDISPLACED FRACTURE OF LATERAL MALLEOLUS OF LEFT FIBULA WITH ROUTINE HEALING, SUBSEQUENT ENCOUNTER: Primary | ICD-10-CM

## 2021-06-18 PROCEDURE — G8419 CALC BMI OUT NRM PARAM NOF/U: HCPCS | Performed by: ORTHOPAEDIC SURGERY

## 2021-06-18 PROCEDURE — 4004F PT TOBACCO SCREEN RCVD TLK: CPT | Performed by: ORTHOPAEDIC SURGERY

## 2021-06-18 PROCEDURE — G8427 DOCREV CUR MEDS BY ELIG CLIN: HCPCS | Performed by: ORTHOPAEDIC SURGERY

## 2021-06-18 PROCEDURE — 3017F COLORECTAL CA SCREEN DOC REV: CPT | Performed by: ORTHOPAEDIC SURGERY

## 2021-06-18 PROCEDURE — 99213 OFFICE O/P EST LOW 20 MIN: CPT | Performed by: ORTHOPAEDIC SURGERY

## 2021-06-18 NOTE — PROGRESS NOTES
Rios Reddy [facility]  [facility] Ramonita Cordova  [address] [facility] [address]  Dept: [phone]    Ambulatory Orthopedic Consult      CHIEF COMPLAINT:    Chief Complaint   Patient presents with    Ankle Pain     Left       HISTORY OF PRESENT ILLNESS:      The patient is a 60 y.o. male who is being seen for evaluation of pain at the above location at the ankle, secondary to an injury that occurred 2/19/2021 secondary to a twisting injury at home. The pain is described mainly with mechanical terms (dull/sharp/throbbing). The pain is worse with activity and better with rest. The patient reports an associated swelling. INTERVAL HISTORY 3/23/2021:  He is seen again today in the office for follow up of a previous issue (as above). Since being seen last, the patient is doing better. He is ambulating today using a CAM boot. The location and quality of the pain have not significantly changed since the last visit. INTERVAL HISTORY 4/20/2021:  He is seen again today in the office for follow up of a previous issue (as above). Since being seen last, the patient is doing better. He is ambulating today using a CAM boot. The location and quality of the pain have not significantly changed since the last visit. INTERVAL HISTORY 6/18/2021:  He is seen again today in the office for follow up of a previous issue (as above). Since being seen last, the patient is doing better. At today's visit, he is not using a brace or assistive device. History is obtained today from:   [x]  the patient     []  EMR     []  one family member/friend    []  multiple family members/friends    []  other:          REVIEW OF SYSTEMS:  Constitutional: Negative for fever. HENT: Negative for tinnitus. Eyes: Negative for pain. Respiratory: Negative for shortness of breath. Cardiovascular: Negative for chest pain. Gastrointestinal: Negative for abdominal pain.    Genitourinary: Negative for dysuria. Skin: Negative for rash. Neurological: Negative for headaches. Hematological: Does not bruise/bleed easily. Musculoskeletal: See HPI for pertinent positives     Past Medical History:    He  has a past medical history of Hypertension. Past Surgical History:    He  has a past surgical history that includes [place] tooth extraction. Current Medications:     Current Outpatient Medications:     lisinopril (PRINIVIL;ZESTRIL) 20 MG tablet, Take 1 tablet by mouth once daily, Disp: 90 tablet, Rfl: 1    simvastatin (ZOCOR) 20 MG tablet, Take 1 tablet by mouth nightly, Disp: 90 tablet, Rfl: 1    amLODIPine (NORVASC) 5 MG tablet, Take 1 tablet by mouth once daily, Disp: 90 tablet, Rfl: 1     Allergies:    Patient has no known allergies. Family History:  family history includes Heart Disease (age of onset: 80) in his mother. 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 Skin:  Intact over foot/ankle, without rash/lesions/AV malformations.    Motion: Able to wiggle toes   -Range of motion of foot/ankle grossly normal  -No tenderness at knee   -Tenderness to palpation: Distal fibula--mild      RADIOLOGY:   6/18/2021 FINDINGS:  Three weightbearing views (AP, Mortise, and Lateral) of the left ankle were obtained in the office today and reviewed, revealing lateral malleolus spiral fracture with no displacement, without medial tibiotalar clear space widening, without syndesmotic widening. Interval healing noted without interval displacement. IMPRESSION: Ankle fracture as above. Electronically signed by Dileep Gerber MD      ASSESSMENT AND PLAN:  Body mass index is 27.72 kg/m². He has a nondisplaced left lateral malleolus fracture, sustained on 2/19/2021. He is doing well overall with conservative management, but still has pain at his fracture site and delayed radiographic healing, which we did discuss today. Notably, he has the past medical history as above. He has a history of tobacco use (reports he smokes 1/2 pack of cigarettes per day). I had another discussion today with the patient about the likely diagnosis and its natural history, physical exam and imaging findings, as well as various treatment options in detail. Surgically, we discussed the possible future left ankle ORIF, possibly at the 6-month mc from his injury, if his fracture does not fully heal.  We again discussed the risks of delayed union, nonunion, and arthritis, as well as the risks of smoking in the setting of healing. Orders/referrals were placed as below at today's visit. He may continue to weight-bear as tolerated, and may use a lace up ankle brace as needed. We discussed the risks of reinjury, and we discussed avoiding certain physical activities that have pivoting/twisting, as those may pose a higher risk of refracture. He may continue home exercises per physical therapy. He will continue to avoid the routine use of NSAIDs.     All questions were answered and

## 2021-08-05 DIAGNOSIS — S82.65XD CLOSED NONDISPLACED FRACTURE OF LATERAL MALLEOLUS OF LEFT FIBULA WITH ROUTINE HEALING, SUBSEQUENT ENCOUNTER: Primary | ICD-10-CM

## 2021-08-13 ENCOUNTER — OFFICE VISIT (OUTPATIENT)
Dept: ORTHOPEDIC SURGERY | Age: 56
End: 2021-08-13
Payer: COMMERCIAL

## 2021-08-13 VITALS — WEIGHT: 179 LBS | BODY MASS INDEX: 28.09 KG/M2 | HEIGHT: 67 IN | RESPIRATION RATE: 12 BRPM

## 2021-08-13 DIAGNOSIS — S92.909K NONUNION OF FRACTURE OF ANKLE AND FOOT: ICD-10-CM

## 2021-08-13 DIAGNOSIS — S82.65XG CLOSED NONDISPLACED FRACTURE OF LATERAL MALLEOLUS OF LEFT FIBULA WITH DELAYED HEALING, SUBSEQUENT ENCOUNTER: Primary | ICD-10-CM

## 2021-08-13 DIAGNOSIS — S82.899K NONUNION OF FRACTURE OF ANKLE AND FOOT: ICD-10-CM

## 2021-08-13 PROCEDURE — G8419 CALC BMI OUT NRM PARAM NOF/U: HCPCS | Performed by: ORTHOPAEDIC SURGERY

## 2021-08-13 PROCEDURE — 4004F PT TOBACCO SCREEN RCVD TLK: CPT | Performed by: ORTHOPAEDIC SURGERY

## 2021-08-13 PROCEDURE — 99213 OFFICE O/P EST LOW 20 MIN: CPT | Performed by: ORTHOPAEDIC SURGERY

## 2021-08-13 PROCEDURE — G8427 DOCREV CUR MEDS BY ELIG CLIN: HCPCS | Performed by: ORTHOPAEDIC SURGERY

## 2021-08-13 PROCEDURE — 3017F COLORECTAL CA SCREEN DOC REV: CPT | Performed by: ORTHOPAEDIC SURGERY

## 2021-08-13 NOTE — PROGRESS NOTES
Rios Reddy [facility]  [facility] Madalyn Hernández  [facility] [address]  Dept: [phone]    Ambulatory Orthopedic Consult      CHIEF COMPLAINT:    Chief Complaint   Patient presents with    Ankle Pain     left       HISTORY OF PRESENT ILLNESS:      The patient is a 60 y.o. male who is being seen for evaluation of pain at the above location at the ankle, secondary to an injury that occurred 2/19/2021 secondary to a twisting injury at home. The pain is described mainly with mechanical terms (dull/sharp/throbbing). The pain is worse with activity and better with rest. The patient reports an associated swelling. INTERVAL HISTORY 3/23/2021:  He is seen again today in the office for follow up of a previous issue (as above). Since being seen last, the patient is doing better. He is ambulating today using a CAM boot. The location and quality of the pain have not significantly changed since the last visit. INTERVAL HISTORY 4/20/2021:  He is seen again today in the office for follow up of a previous issue (as above). Since being seen last, the patient is doing better. He is ambulating today using a CAM boot. The location and quality of the pain have not significantly changed since the last visit. INTERVAL HISTORY 6/18/2021:  He is seen again today in the office for follow up of a previous issue (as above). Since being seen last, the patient is doing better. At today's visit, he is not using a brace or assistive device. History is obtained today from:   [x]  the patient     []  EMR     []  one family member/friend    []  multiple family members/friends    []  other:      INTERVAL HISTORY 8/13/2021:  He is seen again today in the office for follow up of a previous issue (as above). Since being seen last, the patient is doing better. At today's visit, he is not using a brace or assistive device.     History is obtained today from:   [x]  the patient     [] EMR     []  one family member/friend    []  multiple family members/friends    []  other:          REVIEW OF SYSTEMS:  Constitutional: Negative for fever. HENT: Negative for tinnitus. Eyes: Negative for pain. Respiratory: Negative for shortness of breath. Cardiovascular: Negative for chest pain. Gastrointestinal: Negative for abdominal pain. Genitourinary: Negative for dysuria. Skin: Negative for rash. Neurological: Negative for headaches. Hematological: Does not bruise/bleed easily. Musculoskeletal: See HPI for pertinent positives     Past Medical History:    He  has a past medical history of Hypertension. Past Surgical History:    He  has a past surgical history that includes [place] tooth extraction. Current Medications:     Current Outpatient Medications:     lisinopril (PRINIVIL;ZESTRIL) 20 MG tablet, Take 1 tablet by mouth once daily, Disp: 90 tablet, Rfl: 1    simvastatin (ZOCOR) 20 MG tablet, Take 1 tablet by mouth nightly, Disp: 90 tablet, Rfl: 1    amLODIPine (NORVASC) 5 MG tablet, Take 1 tablet by mouth once daily, Disp: 90 tablet, Rfl: 1     Allergies:    Patient has no known allergies. Family History:  family history includes Heart Disease (age of onset: 80) in his mother. 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 Skin:  Intact over foot/ankle, without rash/lesions/AV malformations. Motion: Able to wiggle toes   -Range of motion of foot/ankle grossly normal  -No tenderness at knee   -Tenderness to palpation: Distal fibula--mild      RADIOLOGY:   8/13/2021 FINDINGS:  Three weightbearing views (AP, Mortise, and Lateral) of the left ankle were obtained in the office today and reviewed, revealing lateral malleolus spiral fracture with no displacement, without medial tibiotalar clear space widening, without syndesmotic widening. Interval healing noted without interval displacement. IMPRESSION: Ankle fracture as above. Electronically signed by Minda Avalos MD      ASSESSMENT AND PLAN:  Body mass index is 28.04 kg/m². He has a nondisplaced left lateral malleolus fracture, sustained on 2/19/2021. He is doing well overall with conservative management, but still has pain at his fracture site and delayed radiographic healing with a nonunion, which we did discuss again today. Notably, he has the past medical history as above. He has a history of tobacco use (reports he smokes 1/2 pack of cigarettes per day). I had another discussion today with the patient about the likely diagnosis and its natural history, physical exam and imaging findings, as well as various treatment options in detail. Surgically, we discussed a possible left ankle ORIF, but at today's visit, we did decide to continue with conservative management. He does report that the pain does not significantly bother him much, and he also denies any current limitations, but also reports that he is not fully returned to full activity.   We did proof reading. In such instances, actual meaning can be extrapolated by context.

## 2021-11-29 DIAGNOSIS — I10 ESSENTIAL HYPERTENSION: ICD-10-CM

## 2021-11-29 DIAGNOSIS — E78.2 MIXED HYPERLIPIDEMIA: ICD-10-CM

## 2021-11-29 NOTE — TELEPHONE ENCOUNTER
Last visit: 6/10/21  Last Med refill: 6/10/21  Does patient have enough medication for 72 hours: Yes    PATIENT NEEDS APPT-will send [employer] message    Next Visit Date:  No future appointments.     Health Maintenance   Topic Date Due    Hepatitis C screen  Never done    HIV screen  Never done    Colon cancer screen colonoscopy  Never done    Shingles Vaccine (1 of 2) Never done    Lipid screen  12/04/2019    Potassium monitoring  12/04/2019    Creatinine monitoring  12/04/2019    A1C test (Diabetic or Prediabetic)  08/03/2021    Flu vaccine (1) 09/01/2021    COVID-19 Vaccine (3 - Booster for Pfizer series) 10/03/2021    DTaP/Tdap/Td vaccine (2 - Td or Tdap) 05/18/2027    Pneumococcal 0-64 years Vaccine (2 of 2 - PPSV23) 02/25/2028    Hepatitis A vaccine  Aged Out    Hepatitis B vaccine  Aged Out    Hib vaccine  Aged Out    Meningococcal (ACWY) vaccine  Aged Out       Hemoglobin A1C (%)   Date Value   08/03/2020 5.7   11/02/2015 5.4             ( goal A1C is < 7)   No results found for: LABMICR  LDL Cholesterol (mg/dL)   Date Value   12/04/2018 112   11/02/2015 98       (goal LDL is <100)   AST (U/L)   Date Value   12/04/2018 29     ALT (U/L)   Date Value   12/04/2018 41     BUN (mg/dL)   Date Value   12/04/2018 20     BP Readings from Last 3 Encounters:   02/19/21 (!) 141/94   08/03/20 122/78   09/10/19 128/83          (goal 120/80)    All Future Testing planned in [facility]  Lab Frequency Next Occurrence               Patient Active Problem List:     Chest pain     Tobacco abuse     Hypertension     Mixed hyperlipidemia

## 2021-12-01 RX ORDER — LISINOPRIL 20 MG/1
TABLET ORAL
Qty: 30 TABLET | Refills: 0 | Status: SHIPPED | OUTPATIENT
Start: 2021-12-01 | End: 2022-01-06 | Stop reason: SDUPTHER

## 2021-12-01 RX ORDER — SIMVASTATIN 20 MG
20 TABLET ORAL NIGHTLY
Qty: 30 TABLET | Refills: 0 | Status: SHIPPED | OUTPATIENT
Start: 2021-12-01 | End: 2022-01-06 | Stop reason: SDUPTHER

## 2021-12-01 RX ORDER — AMLODIPINE BESYLATE 5 MG/1
TABLET ORAL
Qty: 30 TABLET | Refills: 0 | Status: SHIPPED | OUTPATIENT
Start: 2021-12-01 | End: 2022-01-06 | Stop reason: SDUPTHER

## 2021-12-24 DIAGNOSIS — I10 ESSENTIAL HYPERTENSION: ICD-10-CM

## 2021-12-24 DIAGNOSIS — E78.2 MIXED HYPERLIPIDEMIA: ICD-10-CM

## 2021-12-27 RX ORDER — AMLODIPINE BESYLATE 5 MG/1
TABLET ORAL
Qty: 30 TABLET | Refills: 0 | OUTPATIENT
Start: 2021-12-27

## 2021-12-27 RX ORDER — LISINOPRIL 20 MG/1
TABLET ORAL
Qty: 30 TABLET | Refills: 0 | OUTPATIENT
Start: 2021-12-27

## 2021-12-27 RX ORDER — SIMVASTATIN 20 MG
20 TABLET ORAL NIGHTLY
Qty: 30 TABLET | Refills: 0 | OUTPATIENT
Start: 2021-12-27

## 2022-01-06 ENCOUNTER — VIRTUAL VISIT (OUTPATIENT)
Dept: FAMILY MEDICINE CLINIC | Age: 57
End: 2022-01-06
Payer: COMMERCIAL

## 2022-01-06 DIAGNOSIS — Z12.11 ENCOUNTER FOR SCREENING FOR COLORECTAL MALIGNANT NEOPLASM: ICD-10-CM

## 2022-01-06 DIAGNOSIS — I10 ESSENTIAL HYPERTENSION: Primary | ICD-10-CM

## 2022-01-06 DIAGNOSIS — E78.2 MIXED HYPERLIPIDEMIA: ICD-10-CM

## 2022-01-06 DIAGNOSIS — Z86.39 HISTORY OF ELEVATED GLUCOSE: ICD-10-CM

## 2022-01-06 DIAGNOSIS — Z12.12 ENCOUNTER FOR SCREENING FOR COLORECTAL MALIGNANT NEOPLASM: ICD-10-CM

## 2022-01-06 PROCEDURE — 99213 OFFICE O/P EST LOW 20 MIN: CPT | Performed by: NURSE PRACTITIONER

## 2022-01-06 PROCEDURE — G8427 DOCREV CUR MEDS BY ELIG CLIN: HCPCS | Performed by: NURSE PRACTITIONER

## 2022-01-06 PROCEDURE — 3017F COLORECTAL CA SCREEN DOC REV: CPT | Performed by: NURSE PRACTITIONER

## 2022-01-06 RX ORDER — SIMVASTATIN 20 MG
20 TABLET ORAL NIGHTLY
Qty: 90 TABLET | Refills: 1 | Status: SHIPPED | OUTPATIENT
Start: 2022-01-06 | End: 2022-06-29

## 2022-01-06 RX ORDER — LISINOPRIL 20 MG/1
TABLET ORAL
Qty: 90 TABLET | Refills: 1 | Status: SHIPPED | OUTPATIENT
Start: 2022-01-06 | End: 2022-06-29

## 2022-01-06 RX ORDER — AMLODIPINE BESYLATE 5 MG/1
TABLET ORAL
Qty: 90 TABLET | Refills: 1 | Status: SHIPPED | OUTPATIENT
Start: 2022-01-06 | End: 2022-06-29

## 2022-01-06 ASSESSMENT — ENCOUNTER SYMPTOMS
COUGH: 0
SHORTNESS OF BREATH: 0

## 2022-01-06 NOTE — PROGRESS NOTES
Tevin Forde (:  1965) is a 60 y.o. male,Established patient, here for evaluation of the following chief complaint(s): Medication Refill         ASSESSMENT/PLAN:  1. Essential hypertension  Assessment & Plan:   Well-controlled, continue current medications  Orders:  -     lisinopril (PRINIVIL;ZESTRIL) 20 MG tablet; take 1 tablet by mouth once daily, Disp-90 tablet, R-1Normal  -     amLODIPine (NORVASC) 5 MG tablet; take 1 tablet by mouth once daily, Disp-90 tablet, R-1Normal  -     CBC; Future  -     Comprehensive Metabolic Panel; Future  2. Mixed hyperlipidemia  -     simvastatin (ZOCOR) 20 MG tablet; Take 1 tablet by mouth nightly, Disp-90 tablet, R-1Normal  -     Lipid, Fasting; Future  3. History of elevated glucose  -     Hemoglobin A1C; Future  4. Encounter for screening for colorectal malignant neoplasm  -     Fecal DNA Colorectal cancer screening (Cologuard)      No follow-ups on file. SUBJECTIVE/OBJECTIVE:  HPI    Review of Systems   Constitutional: Negative for chills and fever. Respiratory: Negative for cough and shortness of breath. Cardiovascular: Negative for chest pain and leg swelling. No flowsheet data found.          [INSTRUCTIONS:  \"[x]\" Indicates a positive item  \"[]\" Indicates a negative item  -- DELETE ALL ITEMS NOT EXAMINED]    Constitutional: [x] Appears well-developed and well-nourished [x] No apparent distress      [] Abnormal -     Mental status: [x] Alert and awake  [x] Oriented to person/place/time [x] Able to follow commands    [] Abnormal -     Eyes:   EOM    [x]  Normal    [] Abnormal -   Sclera  [x]  Normal    [] Abnormal -          Discharge [x]  None visible   [] Abnormal -     HENT: [x] Normocephalic, atraumatic  [] Abnormal -   [x] Mouth/Throat: Mucous membranes are moist    External Ears [x] Normal  [] Abnormal -    Neck: [x] No visualized mass [] Abnormal -     Pulmonary/Chest: [x] Respiratory effort normal   [x] No visualized signs of difficulty breathing or respiratory distress        [] Abnormal -      Musculoskeletal:   [x] Normal gait with no signs of ataxia         [x] Normal range of motion of neck        [] Abnormal -     Neurological:        [x] No Facial Asymmetry (Cranial nerve 7 motor function) (limited exam due to video visit)          [x] No gaze palsy        [] Abnormal -          Skin:        [x] No significant exanthematous lesions or discoloration noted on facial skin         [] Abnormal -            Psychiatric:       [x] Normal Affect [] Abnormal -        [x] No Hallucinations    Other pertinent observable physical exam findings:-          On this date 1/6/2022 I have spent 15 minutes reviewing previous notes, test results and face to face (virtual) with the patient discussing the diagnosis and importance of compliance with the treatment plan as well as documenting on the day of the visit. Catherine De La O, was evaluated through a synchronous (real-time) audio-video encounter. The patient (or guardian if applicable) is aware that this is a billable service. Verbal consent to proceed has been obtained within the past 12 months. The visit was conducted pursuant to the emergency declaration under the [address] [place] [address] authority and the [employer] Act. Patient identification was verified, and a caregiver was present when appropriate. The patient was located in a state where the provider was credentialed to provide care. An electronic signature was used to authenticate this note.     --Slade Saab, TARSHA - CNP

## 2022-06-29 DIAGNOSIS — E78.2 MIXED HYPERLIPIDEMIA: ICD-10-CM

## 2022-06-29 DIAGNOSIS — I10 ESSENTIAL HYPERTENSION: ICD-10-CM

## 2022-06-29 RX ORDER — AMLODIPINE BESYLATE 5 MG/1
TABLET ORAL
Qty: 30 TABLET | Refills: 0 | Status: SHIPPED | OUTPATIENT
Start: 2022-06-29 | End: 2022-08-02 | Stop reason: SDUPTHER

## 2022-06-29 RX ORDER — LISINOPRIL 20 MG/1
TABLET ORAL
Qty: 30 TABLET | Refills: 0 | Status: SHIPPED | OUTPATIENT
Start: 2022-06-29 | End: 2022-08-02 | Stop reason: SDUPTHER

## 2022-06-29 RX ORDER — SIMVASTATIN 20 MG
20 TABLET ORAL NIGHTLY
Qty: 30 TABLET | Refills: 0 | Status: SHIPPED | OUTPATIENT
Start: 2022-06-29 | End: 2022-08-02 | Stop reason: SDUPTHER

## 2022-06-29 NOTE — TELEPHONE ENCOUNTER
Last visit: 1/6/22  Last Med refill: 1/6/22  Does patient have enough medication for 72 hours: Yes    Patient is due for an appointment-scheduled for 7/12    Next Visit Date:  Future Appointments   Date Time Provider Carlton Granados   7/12/2022  1:00 PM TARSHA Avery CNP [facility] [address] Maintenance   Topic Date Due    HIV screen  Never done    Hepatitis C screen  Never done    Colorectal Cancer Screen  Never done    Shingles vaccine (1 of 2) Never done    Pneumococcal 0-64 years Vaccine (2 - PCV) 05/18/2018    Lipids  12/04/2019    Prostate Specific Antigen (PSA) Screening or Monitoring  12/04/2019    A1C test (Diabetic or Prediabetic)  08/03/2021    Depression Screen  06/10/2022    DTaP/Tdap/Td vaccine (2 - Td or Tdap) 05/18/2027    Flu vaccine  Completed    COVID-19 Vaccine  Completed    Hepatitis A vaccine  Aged Out    Hepatitis B vaccine  Aged Out    Hib vaccine  Aged Out    Meningococcal (ACWY) vaccine  Aged Out       Hemoglobin A1C (%)   Date Value   08/03/2020 5.7   11/02/2015 5.4             ( goal A1C is < 7)   No results found for: LABMICR  LDL Cholesterol (mg/dL)   Date Value   12/04/2018 112   11/02/2015 98       (goal LDL is <100)   AST (U/L)   Date Value   12/04/2018 29     ALT (U/L)   Date Value   12/04/2018 41     BUN (mg/dL)   Date Value   12/04/2018 20     BP Readings from Last 3 Encounters:   02/19/21 (!) 141/94   08/03/20 122/78   09/10/19 128/83          (goal 120/80)    All Future Testing planned in [facility]  Lab Frequency Next Occurrence   CBC Once 07/06/2022   Comprehensive Metabolic Panel Once 48/32/8851   Lipid, Fasting Once 07/06/2022   Hemoglobin A1C Once 07/06/2022               Patient Active Problem List:     Chest pain     Tobacco abuse     Essential hypertension     Mixed hyperlipidemia

## 2022-07-30 DIAGNOSIS — E78.2 MIXED HYPERLIPIDEMIA: ICD-10-CM

## 2022-07-30 DIAGNOSIS — I10 ESSENTIAL HYPERTENSION: ICD-10-CM

## 2022-08-01 NOTE — TELEPHONE ENCOUNTER
LAST VISIT:   1/6/2022     Future Appointments   Date Time Provider Carlton Granados   8/2/2022 12:30 PM Unknown [facility]ARSHA De Anda - [facility]NP [facility]

## 2022-08-02 ENCOUNTER — HOSPITAL ENCOUNTER (OUTPATIENT)
Age: 57
Setting detail: SPECIMEN
Discharge: HOME | End: 2022-08-02

## 2022-08-02 ENCOUNTER — OFFICE VISIT (OUTPATIENT)
Dept: FAMILY MEDICINE CLINIC | Age: 57
End: 2022-08-02
Payer: COMMERCIAL

## 2022-08-02 VITALS
DIASTOLIC BLOOD PRESSURE: 60 MMHG | WEIGHT: 173 LBS | BODY MASS INDEX: 27.1 KG/M2 | HEART RATE: 98 BPM | SYSTOLIC BLOOD PRESSURE: 112 MMHG | OXYGEN SATURATION: 97 %

## 2022-08-02 DIAGNOSIS — Z12.5 SCREENING FOR MALIGNANT NEOPLASM OF PROSTATE: ICD-10-CM

## 2022-08-02 DIAGNOSIS — I10 ESSENTIAL HYPERTENSION: Primary | ICD-10-CM

## 2022-08-02 DIAGNOSIS — L30.9 ECZEMA, UNSPECIFIED TYPE: ICD-10-CM

## 2022-08-02 DIAGNOSIS — Z86.39 HISTORY OF ELEVATED GLUCOSE: ICD-10-CM

## 2022-08-02 DIAGNOSIS — E78.2 MIXED HYPERLIPIDEMIA: ICD-10-CM

## 2022-08-02 DIAGNOSIS — I10 ESSENTIAL HYPERTENSION: ICD-10-CM

## 2022-08-02 LAB
ALBUMIN SERPL-MCNC: 4.6 G/DL (ref 3.5–5.2)
ALBUMIN/GLOBULIN RATIO: 1.8 (ref 1–2.5)
ALP BLD-CCNC: 59 U/L (ref 40–129)
ALT SERPL-CCNC: 21 U/L (ref 5–41)
ANION GAP SERPL CALCULATED.3IONS-SCNC: 18 MMOL/L (ref 9–17)
AST SERPL-CCNC: 21 U/L
BILIRUB SERPL-MCNC: 0.79 MG/DL (ref 0.3–1.2)
BUN BLDV-MCNC: 9 MG/DL (ref 6–20)
CALCIUM SERPL-MCNC: 9.5 MG/DL (ref 8.6–10.4)
CHLORIDE BLD-SCNC: 101 MMOL/L (ref 98–107)
CHOLESTEROL, FASTING: 154 MG/DL
CHOLESTEROL/HDL RATIO: 2.1
CO2: 22 MMOL/L (ref 20–31)
CREAT SERPL-MCNC: 0.76 MG/DL (ref 0.7–1.2)
ESTIMATED AVERAGE GLUCOSE: 117 MG/DL
GFR AFRICAN AMERICAN: >60 ML/MIN
GFR NON-AFRICAN AMERICAN: >60 ML/MIN
GFR SERPL CREATININE-BSD FRML MDRD: ABNORMAL ML/MIN/{1.73_M2}
GLUCOSE BLD-MCNC: 108 MG/DL (ref 70–99)
HBA1C MFR BLD: 5.7 % (ref 4–6)
HCT VFR BLD CALC: 49.4 % (ref 40.7–50.3)
HDLC SERPL-MCNC: 73 MG/DL
HEMOGLOBIN: 16.2 G/DL (ref 13–17)
LDL CHOLESTEROL: 66 MG/DL (ref 0–130)
MCH RBC QN AUTO: 29.9 PG (ref 25.2–33.5)
MCHC RBC AUTO-ENTMCNC: 32.8 G/DL (ref 28.4–34.8)
MCV RBC AUTO: 91.3 FL (ref 82.6–102.9)
NRBC AUTOMATED: 0 PER 100 WBC
PDW BLD-RTO: 13.5 % (ref 11.8–14.4)
PLATELET # BLD: 268 K/UL (ref 138–453)
PMV BLD AUTO: 11.4 FL (ref 8.1–13.5)
POTASSIUM SERPL-SCNC: 4.4 MMOL/L (ref 3.7–5.3)
PROSTATE SPECIFIC ANTIGEN: 4.03 NG/ML
RBC # BLD: 5.41 M/UL (ref 4.21–5.77)
SODIUM BLD-SCNC: 141 MMOL/L (ref 135–144)
TOTAL PROTEIN: 7.1 G/DL (ref 6.4–8.3)
TRIGLYCERIDE, FASTING: 74 MG/DL
WBC # BLD: 7.2 K/UL (ref 3.5–11.3)

## 2022-08-02 PROCEDURE — 3017F COLORECTAL CA SCREEN DOC REV: CPT | Performed by: NURSE PRACTITIONER

## 2022-08-02 PROCEDURE — 4004F PT TOBACCO SCREEN RCVD TLK: CPT | Performed by: NURSE PRACTITIONER

## 2022-08-02 PROCEDURE — 99214 OFFICE O/P EST MOD 30 MIN: CPT | Performed by: NURSE PRACTITIONER

## 2022-08-02 PROCEDURE — G8419 CALC BMI OUT NRM PARAM NOF/U: HCPCS | Performed by: NURSE PRACTITIONER

## 2022-08-02 PROCEDURE — G8427 DOCREV CUR MEDS BY ELIG CLIN: HCPCS | Performed by: NURSE PRACTITIONER

## 2022-08-02 RX ORDER — CLOBETASOL PROPIONATE 0.5 MG/G
1 OINTMENT TOPICAL 2 TIMES DAILY
Qty: 30 G | Refills: 0 | Status: SHIPPED | OUTPATIENT
Start: 2022-08-02

## 2022-08-02 RX ORDER — SIMVASTATIN 20 MG
20 TABLET ORAL NIGHTLY
Qty: 30 TABLET | Refills: 3 | OUTPATIENT
Start: 2022-08-02

## 2022-08-02 RX ORDER — AMLODIPINE BESYLATE 5 MG/1
TABLET ORAL
Qty: 90 TABLET | Refills: 1 | Status: SHIPPED | OUTPATIENT
Start: 2022-08-02

## 2022-08-02 RX ORDER — LISINOPRIL 20 MG/1
TABLET ORAL
Qty: 30 TABLET | Refills: 3 | OUTPATIENT
Start: 2022-08-02

## 2022-08-02 RX ORDER — LISINOPRIL 20 MG/1
TABLET ORAL
Qty: 90 TABLET | Refills: 1 | Status: SHIPPED | OUTPATIENT
Start: 2022-08-02

## 2022-08-02 RX ORDER — SIMVASTATIN 20 MG
20 TABLET ORAL NIGHTLY
Qty: 90 TABLET | Refills: 1 | Status: SHIPPED | OUTPATIENT
Start: 2022-08-02

## 2022-08-02 RX ORDER — AMLODIPINE BESYLATE 5 MG/1
TABLET ORAL
Qty: 30 TABLET | Refills: 3 | OUTPATIENT
Start: 2022-08-02

## 2022-08-02 SDOH — ECONOMIC STABILITY: FOOD INSECURITY: WITHIN THE PAST 12 MONTHS, YOU WORRIED THAT YOUR FOOD WOULD RUN OUT BEFORE YOU GOT MONEY TO BUY MORE.: NEVER TRUE

## 2022-08-02 SDOH — ECONOMIC STABILITY: FOOD INSECURITY: WITHIN THE PAST 12 MONTHS, THE FOOD YOU BOUGHT JUST DIDN'T LAST AND YOU DIDN'T HAVE MONEY TO GET MORE.: NEVER TRUE

## 2022-08-02 ASSESSMENT — PATIENT HEALTH QUESTIONNAIRE - PHQ9
SUM OF ALL RESPONSES TO PHQ QUESTIONS 1-9: 0
SUM OF ALL RESPONSES TO PHQ9 QUESTIONS 1 & 2: 0
2. FEELING DOWN, DEPRESSED OR HOPELESS: 0
1. LITTLE INTEREST OR PLEASURE IN DOING THINGS: 0
SUM OF ALL RESPONSES TO PHQ QUESTIONS 1-9: 0

## 2022-08-02 ASSESSMENT — ENCOUNTER SYMPTOMS
COUGH: 0
SHORTNESS OF BREATH: 0

## 2022-08-02 ASSESSMENT — SOCIAL DETERMINANTS OF HEALTH (SDOH): HOW HARD IS IT FOR YOU TO PAY FOR THE VERY BASICS LIKE FOOD, HOUSING, MEDICAL CARE, AND HEATING?: NOT HARD AT ALL

## 2022-08-02 NOTE — PROGRESS NOTES
Luis Carlos Mccauley (:  1965) is a 59 y.o. male,Established patient, here for evaluation of the following chief complaint(s):  Hypertension and Medication Refill         ASSESSMENT/PLAN:  1. Essential hypertension  -     lisinopril (PRINIVIL;ZESTRIL) 20 MG tablet; take 1 tablet by mouth once daily, Disp-90 tablet, R-1Normal  -     amLODIPine (NORVASC) 5 MG tablet; take 1 tablet by mouth once daily, Disp-90 tablet, R-1Normal  2. Mixed hyperlipidemia  -     simvastatin (ZOCOR) 20 MG tablet; Take 1 tablet by mouth nightly, Disp-90 tablet, R-1Normal  3. Screening for malignant neoplasm of prostate  -     PSA Screening; Future  4. Eczema, unspecified type  -     clobetasol (TEMOVATE) 0.05 % ointment; Apply 1 each topically 2 times daily Apply topically 2 times daily. , Topical, 2 TIMES DAILY Starting 2022, Disp-30 g, R-0, Normal      No follow-ups on file. Subjective   SUBJECTIVE/OBJECTIVE:  HPI    Review of Systems   Constitutional:  Negative for chills and fever. Respiratory:  Negative for cough and shortness of breath. Cardiovascular:  Negative for chest pain and leg swelling. Skin:  Positive for rash. Objective     Vitals:    22 1227   BP: 112/60   Pulse: 98   SpO2: 97%     Wt Readings from Last 3 Encounters:   22 173 lb (78.5 kg)   21 179 lb (81.2 kg)   21 177 lb (80.3 kg)       Physical Exam  Constitutional:       General: He is not in acute distress. Appearance: He is well-developed. HENT:      Head: Normocephalic. Right Ear: External ear normal.      Left Ear: External ear normal.   Cardiovascular:      Rate and Rhythm: Normal rate and regular rhythm. Heart sounds: Normal heart sounds. Pulmonary:      Effort: Pulmonary effort is normal.      Breath sounds: Normal breath sounds. Musculoskeletal:         General: Normal range of motion. Skin:     General: Skin is warm and dry.    Neurological:      Mental Status: He is alert and oriented to person, place, and time. An electronic signature was used to authenticate this note.     --TARSHA Hernandez - CNP

## 2023-01-25 DIAGNOSIS — I10 ESSENTIAL HYPERTENSION: ICD-10-CM

## 2023-01-25 DIAGNOSIS — E78.2 MIXED HYPERLIPIDEMIA: ICD-10-CM

## 2023-01-25 RX ORDER — SIMVASTATIN 20 MG
20 TABLET ORAL NIGHTLY
Qty: 90 TABLET | Refills: 1 | Status: SHIPPED | OUTPATIENT
Start: 2023-01-25

## 2023-01-25 RX ORDER — LISINOPRIL 20 MG/1
TABLET ORAL
Qty: 90 TABLET | Refills: 1 | Status: SHIPPED | OUTPATIENT
Start: 2023-01-25

## 2023-01-25 RX ORDER — AMLODIPINE BESYLATE 5 MG/1
TABLET ORAL
Qty: 90 TABLET | Refills: 1 | Status: SHIPPED | OUTPATIENT
Start: 2023-01-25

## 2023-01-25 NOTE — TELEPHONE ENCOUNTER
Last visit: 08/02/2022  Last Med refill: 10/29/2022  Does patient have enough medication for 72 hours: No:     Next Visit Date:  No future appointments.     Health Maintenance   Topic Date Due    HIV screen  Never done    Hepatitis C screen  Never done    Colorectal Cancer Screen  Never done    COVID-19 Vaccine (4 - Booster for Pfizer series) 01/27/2022    Flu vaccine (1) 08/01/2022    A1C test (Diabetic or Prediabetic)  08/02/2023    Lipids  08/02/2023    Depression Screen  08/02/2023    Prostate Specific Antigen (PSA) Screening or Monitoring  08/02/2023    DTaP/Tdap/Td vaccine (2 - Td or Tdap) 05/18/2027    Shingles vaccine  Completed    Pneumococcal 0-64 years Vaccine  Completed    Hepatitis A vaccine  Aged Out    Hib vaccine  Aged Out    Meningococcal (ACWY) vaccine  Aged Out       Hemoglobin A1C (%)   Date Value   08/02/2022 5.7   08/03/2020 5.7   11/02/2015 5.4             ( goal A1C is < 7)   No results found for: LABMICR  LDL Cholesterol (mg/dL)   Date Value   08/02/2022 66   12/04/2018 112       (goal LDL is <100)   AST (U/L)   Date Value   08/02/2022 21     ALT (U/L)   Date Value   08/02/2022 21     BUN (mg/dL)   Date Value   08/02/2022 9     BP Readings from Last 3 Encounters:   08/02/22 112/60   02/19/21 (!) 141/94   08/03/20 122/78          (goal 120/80)    All Future Testing planned in [facility]  Lab Frequency Next Occurrence               Patient Active Problem List:     Chest pain     Tobacco abuse     Essential hypertension     Mixed hyperlipidemia

## 2023-07-27 DIAGNOSIS — E78.2 MIXED HYPERLIPIDEMIA: ICD-10-CM

## 2023-07-27 DIAGNOSIS — I10 ESSENTIAL HYPERTENSION: ICD-10-CM

## 2023-08-03 DIAGNOSIS — E78.2 MIXED HYPERLIPIDEMIA: ICD-10-CM

## 2023-08-03 DIAGNOSIS — I10 ESSENTIAL HYPERTENSION: ICD-10-CM

## 2023-08-03 NOTE — TELEPHONE ENCOUNTER
----- Message from [address] [place] [address] sent at 8/2/2023  2:41 PM EDT -----  Subject: Message to Provider    QUESTIONS  Information for Provider? Pt made appt for 08/09/2023 but will be out of   meds by then will Patricia Chang give him enough until then  ---------------------------------------------------------------------------  --------------  Stephen Buchanan Pastor  [phone]; OK to leave message on voicemail  ---------------------------------------------------------------------------  --------------  SCRIPT ANSWERS  Relationship to Patient?  Self

## 2023-08-04 RX ORDER — SIMVASTATIN 20 MG
20 TABLET ORAL NIGHTLY
Qty: 30 TABLET | Refills: 0 | Status: SHIPPED | OUTPATIENT
Start: 2023-08-04

## 2023-08-04 RX ORDER — AMLODIPINE BESYLATE 5 MG/1
5 TABLET ORAL DAILY
Qty: 30 TABLET | Refills: 0 | Status: SHIPPED | OUTPATIENT
Start: 2023-08-04

## 2023-08-04 RX ORDER — SIMVASTATIN 20 MG
20 TABLET ORAL NIGHTLY
Qty: 90 TABLET | Refills: 1 | OUTPATIENT
Start: 2023-08-04

## 2023-08-04 RX ORDER — AMLODIPINE BESYLATE 5 MG/1
TABLET ORAL
Qty: 90 TABLET | Refills: 1 | OUTPATIENT
Start: 2023-08-04

## 2023-08-04 RX ORDER — LISINOPRIL 20 MG/1
20 TABLET ORAL DAILY
Qty: 30 TABLET | Refills: 0 | Status: SHIPPED | OUTPATIENT
Start: 2023-08-04

## 2023-08-04 RX ORDER — LISINOPRIL 20 MG/1
TABLET ORAL
Qty: 90 TABLET | Refills: 1 | OUTPATIENT
Start: 2023-08-04

## 2023-08-21 ENCOUNTER — HOSPITAL ENCOUNTER (EMERGENCY)
Age: 58
Discharge: HOME | End: 2023-08-21
Attending: EMERGENCY MEDICINE
Payer: COMMERCIAL

## 2023-08-21 VITALS
RESPIRATION RATE: 18 BRPM | TEMPERATURE: 98.2 F | HEART RATE: 103 BPM | DIASTOLIC BLOOD PRESSURE: 100 MMHG | OXYGEN SATURATION: 97 % | SYSTOLIC BLOOD PRESSURE: 156 MMHG

## 2023-08-21 DIAGNOSIS — S81.811A LACERATION OF RIGHT LOWER EXTREMITY, INITIAL ENCOUNTER: Primary | ICD-10-CM

## 2023-08-21 PROCEDURE — 90715 TDAP VACCINE 7 YRS/> IM: CPT | Performed by: STUDENT IN AN ORGANIZED HEALTH CARE EDUCATION/TRAINING PROGRAM

## 2023-08-21 PROCEDURE — 99284 EMERGENCY DEPT VISIT MOD MDM: CPT

## 2023-08-21 PROCEDURE — 90471 IMMUNIZATION ADMIN: CPT | Performed by: STUDENT IN AN ORGANIZED HEALTH CARE EDUCATION/TRAINING PROGRAM

## 2023-08-21 PROCEDURE — 6360000002 HC RX W HCPCS: Performed by: STUDENT IN AN ORGANIZED HEALTH CARE EDUCATION/TRAINING PROGRAM

## 2023-08-21 PROCEDURE — 12002 RPR S/N/AX/GEN/TRNK2.6-7.5CM: CPT

## 2023-08-21 RX ADMIN — TETANUS TOXOID, REDUCED DIPHTHERIA TOXOID AND ACELLULAR PERTUSSIS VACCINE, ADSORBED 0.5 ML: 5; 2.5; 8; 8; 2.5 SUSPENSION INTRAMUSCULAR at 19:18

## 2023-08-21 ASSESSMENT — PAIN SCALES - GENERAL: PAINLEVEL_OUTOF10: 0

## 2023-08-21 ASSESSMENT — PAIN - FUNCTIONAL ASSESSMENT: PAIN_FUNCTIONAL_ASSESSMENT: NONE - DENIES PAIN
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
## 2023-08-22 NOTE — DISCHARGE INSTRUCTIONS
Your evaluated due to a laceration on your leg. We updated your tetanus and gave you 5 stitches. You will need to have the sutures removed in 7 to 10 days. For pain use acetaminophen (Tylenol) or ibuprofen (Motrin / Advil), unless prescribed medications that have acetaminophen or ibuprofen (or similar medications) in it. You can take over the counter acetaminophen tablets (1 - 2 tablets of the 500-mg strength every 6 hours) or ibuprofen tablets (2 tablets every 4 hours). You can shower with the laceration, would avoid baths or swimming in lakes / rivers. Apply bacitracin / triple antibiotic ointment / Neosporin / Vaseline to the wound twice a day. When you go outside, place sunscreen on the healing wound after the sutures have been removed for the next year to help with scarring. PLEASE RETURN TO THE EMERGENCY DEPARTMENT IMMEDIATELY for worsening symptoms, redness around the wound or redness streaking up the body part, white drainage from the wound, or if you develop any concerning symptoms such as: high fever not relieved by acetaminophen (Tylenol) and/or ibuprofen (Motrin / Advil), chills, shortness of breath, chest pain, feeling of your heart fluttering or racing, persistent nausea and/or vomiting, vomiting up blood, blood in your stool, numbness, loss of consciousness, weakness or tingling in the arms or legs or change in color of the extremities, changes in mental status, persistent headache, blurry vision, loss of bladder / bowel control, unable to follow up with your physician, or other any other care or concern.

## 2023-08-22 NOTE — ED PROVIDER NOTES
[address] [place] [address] ED  Emergency Department Encounter  Emergency Medicine Resident     Pt Name:Garcia Baltazar  MRN: [id number]  [facility] 1965  Date of evaluation: 8/21/23  PCP:  TARSHA Staples CNP  Note Started: 8:24 PM EDT      CHIEF COMPLAINT       Chief Complaint   Patient presents with    Laceration     R leg       HISTORY OF PRESENT ILLNESS  (Location/Symptom, Timing/Onset, Context/Setting, Quality, Duration, Modifying Factors, Severity.)      Lesley Napoles is a 59 y.o. male who presents with right leg laceration. Patient reports he was getting a moped out of the garage when he scraped his leg on the pedal.  He reports that it bled initially but he wrapped it and duct tape and that seemed to stop the bleeding. He reports that his family advised that he come to the ED to be evaluated as he has not had a tetanus in over 15 years. Upon evaluation patient's bleeding is controlled and he denies any pain to the wound. He also reports no falls or pain distal to the wound or under the wound. He denies getting anything stuck in it but is not sure exactly. He reports taking no medications prior to coming in and he reports that he is not on anticoagulation. PAST MEDICAL / SURGICAL / SOCIAL / FAMILY HISTORY      has a past medical history of Hypertension. has a past surgical history that includes [place] tooth extraction.     Social History     Socioeconomic History    Marital status: Single     Spouse name: Not on file    Number of children: Not on file    Years of education: Not on file    Highest education level: Not on file   Occupational History    Not on file   Tobacco Use    Smoking status: Every Day     Packs/day: 0.50     Years: 30.00     Pack years: 15.00     Types: Cigarettes    Smokeless tobacco: Never   Substance and Sexual Activity    Alcohol use: Yes    Drug use: No    Sexual activity: Not on file   Other Topics Concern    Not on file   Social History Narrative    Not

## 2023-08-26 DIAGNOSIS — I10 ESSENTIAL HYPERTENSION: ICD-10-CM

## 2023-08-26 DIAGNOSIS — E78.2 MIXED HYPERLIPIDEMIA: ICD-10-CM

## 2023-08-28 NOTE — TELEPHONE ENCOUNTER
Last visit: 08/04/22  Last Med refill: 08/04/23  Does patient have enough medication for 72 hours: Yes    Next Visit Date:  LEFT MESSAGE TO SCHEDULE APPT  No future appointments.     Health Maintenance   Topic Date Due    HIV screen  Never done    Hepatitis C screen  Never done    Colorectal Cancer Screen  Never done    COVID-19 Vaccine (4 - Booster for Pfizer series) 01/27/2022    Flu vaccine (1) 08/01/2023    A1C test (Diabetic or Prediabetic)  08/02/2023    Lipids  08/02/2023    Depression Screen  08/02/2023    Prostate Specific Antigen (PSA) Screening or Monitoring  08/02/2023    DTaP/Tdap/Td vaccine (3 - Td or Tdap) 08/21/2033    Shingles vaccine  Completed    Pneumococcal 0-64 years Vaccine  Completed    Hepatitis A vaccine  Aged Out    Hib vaccine  Aged Out    Meningococcal (ACWY) vaccine  Aged Out    Diabetes screen  Discontinued       Hemoglobin A1C (%)   Date Value   08/02/2022 5.7   08/03/2020 5.7   11/02/2015 5.4             ( goal A1C is < 7)   No components found for: LABMICR  LDL Cholesterol (mg/dL)   Date Value   08/02/2022 66   12/04/2018 112       (goal LDL is <100)   AST (U/L)   Date Value   08/02/2022 21     ALT (U/L)   Date Value   08/02/2022 21     BUN (mg/dL)   Date Value   08/02/2022 9     BP Readings from Last 3 Encounters:   08/21/23 (!) 156/100   08/02/22 112/60   02/19/21 (!) 141/94          (goal 120/80)    All Future Testing planned in [facility]  Lab Frequency Next Occurrence               Patient Active Problem List:     Chest pain     Tobacco abuse     Essential hypertension     Mixed hyperlipidemia

## 2023-08-31 RX ORDER — SIMVASTATIN 20 MG
20 TABLET ORAL NIGHTLY
Qty: 30 TABLET | Refills: 0 | OUTPATIENT
Start: 2023-08-31

## 2023-08-31 RX ORDER — LISINOPRIL 20 MG/1
TABLET ORAL
Qty: 30 TABLET | Refills: 0 | OUTPATIENT
Start: 2023-08-31

## 2023-08-31 RX ORDER — AMLODIPINE BESYLATE 5 MG/1
TABLET ORAL
Qty: 30 TABLET | Refills: 0 | OUTPATIENT
Start: 2023-08-31

## 2023-09-05 DIAGNOSIS — I10 ESSENTIAL HYPERTENSION: ICD-10-CM

## 2023-09-05 RX ORDER — LISINOPRIL 20 MG/1
20 TABLET ORAL DAILY
Qty: 7 TABLET | Refills: 0 | Status: CANCELLED | OUTPATIENT
Start: 2023-09-05

## 2023-09-05 NOTE — TELEPHONE ENCOUNTER
----- Message from Wilian Randle sent at 9/5/2023 12:37 PM EDT -----  Subject: Refill Request    QUESTIONS  Name of Medication? lisinopril (PRINIVIL;ZESTRIL) 20 MG tablet  Patient-reported dosage and instructions? 1 tablet every morning  How many days do you have left? 0  Preferred Pharmacy? [facility]  Pharmacy phone number (if available)? [phone]  ---------------------------------------------------------------------------  --------------  CALL BACK INFO  What is the best way for the office to contact you? OK to leave message on   voicemail  Preferred Call Back Phone Number? [phone]  ---------------------------------------------------------------------------  --------------  SCRIPT ANSWERS  Relationship to Patient?  Self

## 2023-09-05 NOTE — TELEPHONE ENCOUNTER
Last visit: 8/2/22  Last Med refill: 8/4/23  Does patient have enough medication for 72 hours: No:     Next Visit Date:  Future Appointments   Date Time Provider [address]   9/7/2023 10:00 AM TARSHA South CNP [facility] [address] Maintenance   Topic Date Due    HIV screen  Never done    Hepatitis C screen  Never done    Colorectal Cancer Screen  Never done    COVID-19 Vaccine (4 - Booster for Pfizer series) 01/27/2022    Flu vaccine (1) 08/01/2023    A1C test (Diabetic or Prediabetic)  08/02/2023    Lipids  08/02/2023    Depression Screen  08/02/2023    Prostate Specific Antigen (PSA) Screening or Monitoring  08/02/2023    DTaP/Tdap/Td vaccine (3 - Td or Tdap) 08/21/2033    Shingles vaccine  Completed    Pneumococcal 0-64 years Vaccine  Completed    Hepatitis A vaccine  Aged Out    Hib vaccine  Aged Out    Meningococcal (ACWY) vaccine  Aged Out    Diabetes screen  Discontinued       Hemoglobin A1C (%)   Date Value   08/02/2022 5.7   08/03/2020 5.7   11/02/2015 5.4             ( goal A1C is < 7)   No components found for: LABMICR  LDL Cholesterol (mg/dL)   Date Value   08/02/2022 66   12/04/2018 112       (goal LDL is <100)   AST (U/L)   Date Value   08/02/2022 21     ALT (U/L)   Date Value   08/02/2022 21     BUN (mg/dL)   Date Value   08/02/2022 9     BP Readings from Last 3 Encounters:   08/21/23 (!) 156/100   08/02/22 112/60   02/19/21 (!) 141/94          (goal 120/80)    All Future Testing planned in [facility]  Lab Frequency Next Occurrence               Patient Active Problem List:     Chest pain     Tobacco abuse     Essential hypertension     Mixed hyperlipidemia

## 2023-09-06 DIAGNOSIS — E78.2 MIXED HYPERLIPIDEMIA: ICD-10-CM

## 2023-09-06 DIAGNOSIS — I10 ESSENTIAL HYPERTENSION: ICD-10-CM

## 2023-09-06 RX ORDER — SIMVASTATIN 20 MG
20 TABLET ORAL NIGHTLY
Qty: 30 TABLET | Refills: 0 | OUTPATIENT
Start: 2023-09-06

## 2023-09-06 RX ORDER — LISINOPRIL 20 MG/1
TABLET ORAL
Qty: 30 TABLET | Refills: 0 | OUTPATIENT
Start: 2023-09-06

## 2023-09-06 RX ORDER — AMLODIPINE BESYLATE 5 MG/1
TABLET ORAL
Qty: 30 TABLET | Refills: 0 | OUTPATIENT
Start: 2023-09-06

## 2023-09-07 ENCOUNTER — TELEMEDICINE (OUTPATIENT)
Dept: FAMILY MEDICINE CLINIC | Age: 58
End: 2023-09-07
Payer: COMMERCIAL

## 2023-09-07 ENCOUNTER — HOSPITAL ENCOUNTER (OUTPATIENT)
Age: 58
Setting detail: SPECIMEN
Discharge: HOME | End: 2023-09-07

## 2023-09-07 DIAGNOSIS — Z86.39 HISTORY OF ELEVATED GLUCOSE: ICD-10-CM

## 2023-09-07 DIAGNOSIS — Z12.5 SCREENING FOR MALIGNANT NEOPLASM OF PROSTATE: ICD-10-CM

## 2023-09-07 DIAGNOSIS — I10 ESSENTIAL HYPERTENSION: Primary | ICD-10-CM

## 2023-09-07 DIAGNOSIS — E78.2 MIXED HYPERLIPIDEMIA: ICD-10-CM

## 2023-09-07 DIAGNOSIS — Z71.1 CONCERN ABOUT SKIN DISEASE WITHOUT DIAGNOSIS: ICD-10-CM

## 2023-09-07 DIAGNOSIS — I10 ESSENTIAL HYPERTENSION: ICD-10-CM

## 2023-09-07 LAB
ALBUMIN SERPL-MCNC: 4.2 G/DL (ref 3.5–5.2)
ALBUMIN/GLOB SERPL: 1.5 {RATIO} (ref 1–2.5)
ALP SERPL-CCNC: 56 U/L (ref 40–129)
ALT SERPL-CCNC: 26 U/L (ref 5–41)
ANION GAP SERPL CALCULATED.3IONS-SCNC: 11 MMOL/L (ref 9–17)
AST SERPL-CCNC: 20 U/L
BILIRUB SERPL-MCNC: 0.4 MG/DL (ref 0.3–1.2)
BUN SERPL-MCNC: 12 MG/DL (ref 8–23)
CALCIUM SERPL-MCNC: 9.1 MG/DL (ref 8.6–10.4)
CHLORIDE SERPL-SCNC: 102 MMOL/L (ref 98–107)
CHOLEST SERPL-MCNC: 145 MG/DL
CHOLESTEROL/HDL RATIO: 2.2
CO2 SERPL-SCNC: 27 MMOL/L (ref 20–31)
CREAT SERPL-MCNC: 0.9 MG/DL (ref 0.7–1.2)
ERYTHROCYTE [DISTWIDTH] IN BLOOD BY AUTOMATED COUNT: 12.7 % (ref 11.8–14.4)
EST. AVERAGE GLUCOSE BLD GHB EST-MCNC: 114 MG/DL
GFR SERPL CREATININE-BSD FRML MDRD: >60 ML/MIN/1.73M2
GLUCOSE SERPL-MCNC: 101 MG/DL (ref 70–99)
HBA1C MFR BLD: 5.6 % (ref 4–6)
HCT VFR BLD AUTO: 49.9 % (ref 40.7–50.3)
HDLC SERPL-MCNC: 65 MG/DL
HGB BLD-MCNC: 16.6 G/DL (ref 13–17)
LDLC SERPL CALC-MCNC: 61 MG/DL (ref 0–130)
MCH RBC QN AUTO: 30.3 PG (ref 25.2–33.5)
MCHC RBC AUTO-ENTMCNC: 33.3 G/DL (ref 28.4–34.8)
MCV RBC AUTO: 91.1 FL (ref 82.6–102.9)
NRBC BLD-RTO: 0 PER 100 WBC
PLATELET # BLD AUTO: 246 K/UL (ref 138–453)
PMV BLD AUTO: 11.7 FL (ref 8.1–13.5)
POTASSIUM SERPL-SCNC: 4.4 MMOL/L (ref 3.7–5.3)
PROT SERPL-MCNC: 7 G/DL (ref 6.4–8.3)
PSA SERPL-MCNC: 4.3 NG/ML
RBC # BLD AUTO: 5.48 M/UL (ref 4.21–5.77)
SODIUM SERPL-SCNC: 140 MMOL/L (ref 135–144)
TRIGL SERPL-MCNC: 94 MG/DL
WBC OTHER # BLD: 9 K/UL (ref 3.5–11.3)

## 2023-09-07 PROCEDURE — 99214 OFFICE O/P EST MOD 30 MIN: CPT | Performed by: NURSE PRACTITIONER

## 2023-09-07 PROCEDURE — 3017F COLORECTAL CA SCREEN DOC REV: CPT | Performed by: NURSE PRACTITIONER

## 2023-09-07 PROCEDURE — G8427 DOCREV CUR MEDS BY ELIG CLIN: HCPCS | Performed by: NURSE PRACTITIONER

## 2023-09-07 RX ORDER — LISINOPRIL 20 MG/1
20 TABLET ORAL DAILY
Qty: 90 TABLET | Refills: 1 | Status: SHIPPED | OUTPATIENT
Start: 2023-09-07

## 2023-09-07 RX ORDER — SIMVASTATIN 20 MG
20 TABLET ORAL NIGHTLY
Qty: 90 TABLET | Refills: 1 | Status: SHIPPED | OUTPATIENT
Start: 2023-09-07

## 2023-09-07 RX ORDER — AMLODIPINE BESYLATE 5 MG/1
5 TABLET ORAL DAILY
Qty: 90 TABLET | Refills: 1 | Status: SHIPPED | OUTPATIENT
Start: 2023-09-07

## 2023-09-07 SDOH — ECONOMIC STABILITY: HOUSING INSECURITY
IN THE LAST 12 MONTHS, WAS THERE A TIME WHEN YOU DID NOT HAVE A STEADY PLACE TO SLEEP OR SLEPT IN A SHELTER (INCLUDING NOW)?: NO

## 2023-09-07 SDOH — ECONOMIC STABILITY: INCOME INSECURITY: HOW HARD IS IT FOR YOU TO PAY FOR THE VERY BASICS LIKE FOOD, HOUSING, MEDICAL CARE, AND HEATING?: NOT HARD AT ALL

## 2023-09-07 SDOH — ECONOMIC STABILITY: FOOD INSECURITY: WITHIN THE PAST 12 MONTHS, YOU WORRIED THAT YOUR FOOD WOULD RUN OUT BEFORE YOU GOT MONEY TO BUY MORE.: NEVER TRUE

## 2023-09-07 SDOH — ECONOMIC STABILITY: FOOD INSECURITY: WITHIN THE PAST 12 MONTHS, THE FOOD YOU BOUGHT JUST DIDN'T LAST AND YOU DIDN'T HAVE MONEY TO GET MORE.: NEVER TRUE

## 2023-09-07 ASSESSMENT — PATIENT HEALTH QUESTIONNAIRE - PHQ9
2. FEELING DOWN, DEPRESSED OR HOPELESS: 0
SUM OF ALL RESPONSES TO PHQ QUESTIONS 1-9: 0
SUM OF ALL RESPONSES TO PHQ9 QUESTIONS 1 & 2: 0
1. LITTLE INTEREST OR PLEASURE IN DOING THINGS: 0

## 2023-09-07 NOTE — PROGRESS NOTES
Shane Reyes, was evaluated through a synchronous (real-time) audio-video encounter. The patient (or guardian if applicable) is aware that this is a billable service, which includes applicable co-pays. This Virtual Visit was conducted with patient's (and/or legal guardian's) consent. Patient identification was verified, and a caregiver was present when appropriate. The patient was located at Home: [address] [place] [address] [address]  Provider was located at Home ([facility]): [address] (:  1965) is a Established patient, presenting virtually for evaluation of the following:    Assessment & Plan   Below is the assessment and plan developed based on review of pertinent history, physical exam, labs, studies, and medications. 1. Essential hypertension  -     lisinopril (PRINIVIL;ZESTRIL) 20 MG tablet; Take 1 tablet by mouth daily, Disp-90 tablet, R-1Normal  -     amLODIPine (NORVASC) 5 MG tablet; Take 1 tablet by mouth daily, Disp-90 tablet, R-1Normal  -     CBC; Future  -     Comprehensive Metabolic Panel; Future  2. Mixed hyperlipidemia  -     simvastatin (ZOCOR) 20 MG tablet; Take 1 tablet by mouth nightly, Disp-90 tablet, R-1Normal  -     Lipid, Fasting; Future  3. Screening for malignant neoplasm of prostate  -     PSA Screening; Future  4. History of elevated glucose  -     Hemoglobin A1C; Future  5. Concern about skin disease without diagnosis  -     Maretta Severs, MD, Dermatology, [facility]    No follow-ups on file. Subjective   HPI  Htn-     Wants to establish with derm, used to  spend a lot of time in the sun without protection.     Review of Systems       Objective   Patient-Reported Vitals  No data recorded     Physical Exam  [INSTRUCTIONS:  \"[x]\" Indicates a positive item  \"[]\" Indicates a negative item  -- DELETE ALL ITEMS NOT EXAMINED]    Constitutional: [x] Appears well-developed and well-nourished [x] No apparent distress      [] Abnormal -     Mental status:

## 2023-09-07 NOTE — PROGRESS NOTES
Patient is present for med refills for HTN    Patient came in office to have bp checked  152/80   Patient states he has been out of meds for 3 days    2nd readin/80 HR 88    Gave patient number for [facility], states they never mailed one to him

## 2023-09-13 ENCOUNTER — TELEPHONE (OUTPATIENT)
Dept: FAMILY MEDICINE CLINIC | Age: 58
End: 2023-09-13

## 2023-11-29 ENCOUNTER — TELEPHONE (OUTPATIENT)
Dept: FAMILY MEDICINE CLINIC | Age: 58
End: 2023-11-29

## 2023-11-29 NOTE — TELEPHONE ENCOUNTER
He is to quarantine for 5 days, after quarantine is over and symptoms are improving and he has not had a fever in greater than 24 hours he can go back to work but he will need to wear a good quality mask for 5 days.  Ok to give letter

## 2023-11-29 NOTE — TELEPHONE ENCOUNTER
patient who tested positive on MON for COVID just walked in requesting a work letter. He wanted to wait. I told him a message needs to be sent to [facility] who is at a different office. I did ask the patient to send a picture of the positive test through his my chart.  He said did not go to a clinic it was a home test.  He works for [employer] and works Thurs-Sat and would like to be off until 12/03/23 returning to work on 12/04/23  PLEASE ADVISE

## 2023-11-29 NOTE — TELEPHONE ENCOUNTER
Patient states symptoms started on 11/26/23.  Patient is having body aches, coughing with congestion, loss of taste and diarrhea

## 2024-02-13 DIAGNOSIS — I10 ESSENTIAL HYPERTENSION: ICD-10-CM

## 2024-02-13 DIAGNOSIS — E78.2 MIXED HYPERLIPIDEMIA: ICD-10-CM

## 2024-02-14 NOTE — TELEPHONE ENCOUNTER
Last visit: 09/07/2023  Last Med refill: 11/17/2023  Does patient have enough medication for 72 hours: No:     Next Visit Date:  No future appointments.    Health Maintenance   Topic Date Due    HIV screen  Never done    Hepatitis C screen  Never done    Colorectal Cancer Screen  Never done    Pneumococcal 0-64 years Vaccine (2 - PCV) 05/18/2018    Respiratory Syncytial Virus (RSV) Pregnant or age 60 yrs+ (1 - 1-dose 60+ series) Never done    Flu vaccine (1) 08/01/2023    COVID-19 Vaccine (4 - 2023-24 season) 09/01/2023    Lipids  09/07/2024    Depression Screen  09/07/2024    Prostate Specific Antigen (PSA) Screening or Monitoring  09/07/2024    DTaP/Tdap/Td vaccine (3 - Td or Tdap) 08/21/2033    Shingles vaccine  Completed    Hepatitis A vaccine  Aged Out    Hepatitis B vaccine  Aged Out    Hib vaccine  Aged Out    Polio vaccine  Aged Out    Meningococcal (ACWY) vaccine  Aged Out    A1C test (Diabetic or Prediabetic)  Discontinued    Diabetes screen  Discontinued       Hemoglobin A1C (%)   Date Value   09/07/2023 5.6   08/02/2022 5.7   08/03/2020 5.7             ( goal A1C is < 7)   No components found for: \"LABMICR\"  LDL Cholesterol (mg/dL)   Date Value   09/07/2023 61   08/02/2022 66       (goal LDL is <100)   AST (U/L)   Date Value   09/07/2023 20     ALT (U/L)   Date Value   09/07/2023 26     BUN (mg/dL)   Date Value   09/07/2023 12     BP Readings from Last 3 Encounters:   08/21/23 (!) 156/100   08/02/22 112/60   02/19/21 (!) 141/94          (goal 120/80)    All Future Testing planned in [facility]  Lab Frequency Next Occurrence               Patient Active Problem List:     Chest pain     Tobacco abuse     Essential hypertension     Mixed hyperlipidemia

## 2024-02-15 RX ORDER — AMLODIPINE BESYLATE 5 MG/1
5 TABLET ORAL DAILY
Qty: 90 TABLET | Refills: 0 | OUTPATIENT
Start: 2024-02-15

## 2024-02-15 RX ORDER — LISINOPRIL 20 MG/1
20 TABLET ORAL DAILY
Qty: 90 TABLET | Refills: 0 | OUTPATIENT
Start: 2024-02-15

## 2024-02-15 RX ORDER — SIMVASTATIN 20 MG
20 TABLET ORAL NIGHTLY
Qty: 90 TABLET | Refills: 0 | OUTPATIENT
Start: 2024-02-15

## 2024-02-15 NOTE — TELEPHONE ENCOUNTER
Pt scheduled. Pt states he still has one more 90 day refill left so he does not need medication as of right now.

## 2024-02-26 DIAGNOSIS — I10 ESSENTIAL HYPERTENSION: ICD-10-CM

## 2024-02-26 DIAGNOSIS — E78.2 MIXED HYPERLIPIDEMIA: ICD-10-CM

## 2024-02-26 NOTE — TELEPHONE ENCOUNTER
Last OV: 9/7/2023    Patient is requesting prescription for Vitamin D and Vitamin B12 since insurance will cover them both.

## 2024-02-27 RX ORDER — SIMVASTATIN 20 MG
20 TABLET ORAL NIGHTLY
Qty: 90 TABLET | Refills: 1 | Status: SHIPPED | OUTPATIENT
Start: 2024-02-27

## 2024-02-27 RX ORDER — LISINOPRIL 20 MG/1
20 TABLET ORAL DAILY
Qty: 90 TABLET | Refills: 1 | Status: SHIPPED | OUTPATIENT
Start: 2024-02-27

## 2024-02-27 RX ORDER — AMLODIPINE BESYLATE 5 MG/1
5 TABLET ORAL DAILY
Qty: 90 TABLET | Refills: 1 | Status: SHIPPED | OUTPATIENT
Start: 2024-02-27

## 2024-04-24 ENCOUNTER — TELEPHONE (OUTPATIENT)
Dept: FAMILY MEDICINE CLINIC | Age: 59
End: 2024-04-24

## 2024-08-15 DIAGNOSIS — I10 ESSENTIAL HYPERTENSION: ICD-10-CM

## 2024-08-15 DIAGNOSIS — E78.2 MIXED HYPERLIPIDEMIA: ICD-10-CM

## 2024-08-15 RX ORDER — AMLODIPINE BESYLATE 5 MG/1
5 TABLET ORAL DAILY
Qty: 90 TABLET | Refills: 1 | OUTPATIENT
Start: 2024-08-15

## 2024-08-15 RX ORDER — LISINOPRIL 20 MG/1
20 TABLET ORAL DAILY
Qty: 90 TABLET | Refills: 1 | OUTPATIENT
Start: 2024-08-15

## 2024-08-15 RX ORDER — SIMVASTATIN 20 MG
20 TABLET ORAL DAILY
Qty: 90 TABLET | Refills: 1 | OUTPATIENT
Start: 2024-08-15

## 2024-08-15 NOTE — TELEPHONE ENCOUNTER
Last visit: 9/07/2023  Last Med refill: 05/27/2024  Does patient have enough medication for 72 hours: No: \    Next Visit Date:  No future appointments.    Health Maintenance   Topic Date Due    HIV screen  Never done    Hepatitis C screen  Never done    Colorectal Cancer Screen  Never done    Pneumococcal 0-64 years Vaccine (2 of 2 - PCV) 05/18/2018    Respiratory Syncytial Virus (RSV) Pregnant or age 60 yrs+ (1 - 1-dose 60+ series) Never done    COVID-19 Vaccine (4 - 2023-24 season) 09/01/2023    Flu vaccine (1) 08/01/2024    Lipids  09/07/2024    Depression Screen  09/07/2024    Prostate Specific Antigen (PSA) Screening or Monitoring  09/07/2024    DTaP/Tdap/Td vaccine (3 - Td or Tdap) 08/21/2033    Shingles vaccine  Completed    Hepatitis A vaccine  Aged Out    Hepatitis B vaccine  Aged Out    Hib vaccine  Aged Out    Polio vaccine  Aged Out    Meningococcal (ACWY) vaccine  Aged Out    A1C test (Diabetic or Prediabetic)  Discontinued    Diabetes screen  Discontinued       Hemoglobin A1C (%)   Date Value   09/07/2023 5.6   08/02/2022 5.7   08/03/2020 5.7             ( goal A1C is < 7)   No components found for: \"LABMICR\"  No components found for: \"LDLCHOLESTEROL\", \"LDLCALC\"    (goal LDL is <100)   AST (U/L)   Date Value   09/07/2023 20     ALT (U/L)   Date Value   09/07/2023 26     BUN (mg/dL)   Date Value   09/07/2023 12     BP Readings from Last 3 Encounters:   08/21/23 (!) 156/100   08/02/22 112/60   02/19/21 (!) 141/94          (goal 120/80)    All Future Testing planned in [facility]  Lab Frequency Next Occurrence               Patient Active Problem List:     Chest pain     Tobacco abuse     Essential hypertension     Mixed hyperlipidemia

## 2024-08-28 DIAGNOSIS — E78.2 MIXED HYPERLIPIDEMIA: ICD-10-CM

## 2024-08-28 DIAGNOSIS — I10 ESSENTIAL HYPERTENSION: ICD-10-CM

## 2024-08-28 RX ORDER — SIMVASTATIN 20 MG
20 TABLET ORAL DAILY
Qty: 90 TABLET | Refills: 1 | OUTPATIENT
Start: 2024-08-28

## 2024-08-28 RX ORDER — AMLODIPINE BESYLATE 5 MG/1
5 TABLET ORAL DAILY
Qty: 90 TABLET | Refills: 1 | OUTPATIENT
Start: 2024-08-28

## 2024-08-28 RX ORDER — LISINOPRIL 20 MG/1
20 TABLET ORAL DAILY
Qty: 90 TABLET | Refills: 1 | OUTPATIENT
Start: 2024-08-28

## 2024-09-03 DIAGNOSIS — E78.2 MIXED HYPERLIPIDEMIA: ICD-10-CM

## 2024-09-03 DIAGNOSIS — I10 ESSENTIAL HYPERTENSION: ICD-10-CM

## 2024-09-03 NOTE — TELEPHONE ENCOUNTER
Patient contacted office to scheduled appt. Patient is scheduled for 10/2/24, asking for refills until then    [facility] pharmacy

## 2024-09-04 RX ORDER — AMLODIPINE BESYLATE 5 MG/1
5 TABLET ORAL DAILY
Qty: 30 TABLET | Refills: 0 | Status: SHIPPED | OUTPATIENT
Start: 2024-09-04

## 2024-09-04 RX ORDER — SIMVASTATIN 20 MG
20 TABLET ORAL NIGHTLY
Qty: 30 TABLET | Refills: 0 | Status: SHIPPED | OUTPATIENT
Start: 2024-09-04

## 2024-09-04 RX ORDER — LISINOPRIL 20 MG/1
20 TABLET ORAL DAILY
Qty: 30 TABLET | Refills: 0 | Status: SHIPPED | OUTPATIENT
Start: 2024-09-04

## 2024-10-02 ENCOUNTER — OFFICE VISIT (OUTPATIENT)
Dept: PRIMARY CARE CLINIC | Age: 59
End: 2024-10-02

## 2024-10-02 ENCOUNTER — HOSPITAL ENCOUNTER (OUTPATIENT)
Age: 59
Discharge: HOME | End: 2024-10-02
Payer: COMMERCIAL

## 2024-10-02 VITALS
BODY MASS INDEX: 27.1 KG/M2 | DIASTOLIC BLOOD PRESSURE: 83 MMHG | SYSTOLIC BLOOD PRESSURE: 125 MMHG | HEART RATE: 73 BPM | WEIGHT: 173 LBS

## 2024-10-02 DIAGNOSIS — Z13.21 ENCOUNTER FOR VITAMIN DEFICIENCY SCREENING: ICD-10-CM

## 2024-10-02 DIAGNOSIS — I10 ESSENTIAL HYPERTENSION: ICD-10-CM

## 2024-10-02 DIAGNOSIS — Z13.220 SCREENING, LIPID: ICD-10-CM

## 2024-10-02 DIAGNOSIS — Z13.29 SCREENING FOR THYROID DISORDER: ICD-10-CM

## 2024-10-02 DIAGNOSIS — Z12.5 SCREENING FOR MALIGNANT NEOPLASM OF PROSTATE: ICD-10-CM

## 2024-10-02 DIAGNOSIS — Z23 NEED FOR INFLUENZA VACCINATION: Primary | ICD-10-CM

## 2024-10-02 DIAGNOSIS — L40.9 PSORIASIS: ICD-10-CM

## 2024-10-02 DIAGNOSIS — E78.2 MIXED HYPERLIPIDEMIA: ICD-10-CM

## 2024-10-02 LAB
25(OH)D3 SERPL-MCNC: 33.4 NG/ML (ref 30–100)
ALBUMIN SERPL-MCNC: 4.9 G/DL (ref 3.5–5.2)
ALBUMIN/GLOB SERPL: 2 {RATIO} (ref 1–2.5)
ALP SERPL-CCNC: 62 U/L (ref 40–129)
ALT SERPL-CCNC: 25 U/L (ref 10–50)
ANION GAP SERPL CALCULATED.3IONS-SCNC: 11 MMOL/L (ref 9–16)
AST SERPL-CCNC: 27 U/L (ref 10–50)
BASOPHILS # BLD: <0.03 K/UL (ref 0–0.2)
BASOPHILS NFR BLD: 0 % (ref 0–2)
BILIRUB SERPL-MCNC: 0.4 MG/DL (ref 0–1.2)
BUN SERPL-MCNC: 13 MG/DL (ref 8–23)
CALCIUM SERPL-MCNC: 9.6 MG/DL (ref 8.6–10.4)
CHLORIDE SERPL-SCNC: 103 MMOL/L (ref 98–107)
CHOLEST SERPL-MCNC: 160 MG/DL (ref 0–199)
CHOLESTEROL/HDL RATIO: 2
CO2 SERPL-SCNC: 27 MMOL/L (ref 20–31)
CREAT SERPL-MCNC: 0.8 MG/DL (ref 0.7–1.2)
EOSINOPHIL # BLD: 0.1 K/UL (ref 0–0.44)
EOSINOPHILS RELATIVE PERCENT: 1 % (ref 1–4)
ERYTHROCYTE [DISTWIDTH] IN BLOOD BY AUTOMATED COUNT: 13.1 % (ref 11.8–14.4)
GFR, ESTIMATED: >90 ML/MIN/1.73M2
GLUCOSE SERPL-MCNC: 126 MG/DL (ref 74–99)
HCT VFR BLD AUTO: 50.5 % (ref 40.7–50.3)
HDLC SERPL-MCNC: 80 MG/DL
HGB BLD-MCNC: 16.7 G/DL (ref 13–17)
IMM GRANULOCYTES # BLD AUTO: 0.03 K/UL (ref 0–0.3)
IMM GRANULOCYTES NFR BLD: 0 %
LDLC SERPL CALC-MCNC: 70 MG/DL (ref 0–100)
LYMPHOCYTES NFR BLD: 1.98 K/UL (ref 1.1–3.7)
LYMPHOCYTES RELATIVE PERCENT: 25 % (ref 24–43)
MCH RBC QN AUTO: 29.8 PG (ref 25.2–33.5)
MCHC RBC AUTO-ENTMCNC: 33.1 G/DL (ref 28.4–34.8)
MCV RBC AUTO: 90.2 FL (ref 82.6–102.9)
MONOCYTES NFR BLD: 0.72 K/UL (ref 0.1–1.2)
MONOCYTES NFR BLD: 9 % (ref 3–12)
NEUTROPHILS NFR BLD: 65 % (ref 36–65)
NEUTS SEG NFR BLD: 5.16 K/UL (ref 1.5–8.1)
NRBC BLD-RTO: 0 PER 100 WBC
PLATELET # BLD AUTO: 238 K/UL (ref 138–453)
PMV BLD AUTO: 11.2 FL (ref 8.1–13.5)
POTASSIUM SERPL-SCNC: 5.2 MMOL/L (ref 3.7–5.3)
PROT SERPL-MCNC: 7.5 G/DL (ref 6.6–8.7)
PSA SERPL-MCNC: 4.8 NG/ML (ref 0–4)
RBC # BLD AUTO: 5.6 M/UL (ref 4.21–5.77)
SODIUM SERPL-SCNC: 141 MMOL/L (ref 136–145)
TRIGL SERPL-MCNC: 50 MG/DL
TSH SERPL DL<=0.05 MIU/L-ACNC: 1.42 UIU/ML (ref 0.27–4.2)
VLDLC SERPL CALC-MCNC: 10 MG/DL
WBC OTHER # BLD: 8 K/UL (ref 3.5–11.3)

## 2024-10-02 PROCEDURE — 82306 VITAMIN D 25 HYDROXY: CPT

## 2024-10-02 PROCEDURE — 36415 COLL VENOUS BLD VENIPUNCTURE: CPT

## 2024-10-02 PROCEDURE — G0103 PSA SCREENING: HCPCS

## 2024-10-02 PROCEDURE — 80061 LIPID PANEL: CPT

## 2024-10-02 PROCEDURE — 84443 ASSAY THYROID STIM HORMONE: CPT

## 2024-10-02 PROCEDURE — 85025 COMPLETE CBC W/AUTO DIFF WBC: CPT

## 2024-10-02 PROCEDURE — 80053 COMPREHEN METABOLIC PANEL: CPT

## 2024-10-02 RX ORDER — AMLODIPINE BESYLATE 5 MG/1
5 TABLET ORAL DAILY
Qty: 90 TABLET | Refills: 1 | Status: SHIPPED | OUTPATIENT
Start: 2024-10-02

## 2024-10-02 RX ORDER — SIMVASTATIN 20 MG
20 TABLET ORAL NIGHTLY
Qty: 90 TABLET | Refills: 1 | Status: SHIPPED | OUTPATIENT
Start: 2024-10-02

## 2024-10-02 RX ORDER — CLOBETASOL PROPIONATE 0.5 MG/G
CREAM TOPICAL
Qty: 60 G | Refills: 5 | Status: SHIPPED | OUTPATIENT
Start: 2024-10-02

## 2024-10-02 RX ORDER — LISINOPRIL 20 MG/1
20 TABLET ORAL DAILY
Qty: 90 TABLET | Refills: 1 | Status: SHIPPED | OUTPATIENT
Start: 2024-10-02

## 2024-10-02 SDOH — ECONOMIC STABILITY: FOOD INSECURITY: WITHIN THE PAST 12 MONTHS, YOU WORRIED THAT YOUR FOOD WOULD RUN OUT BEFORE YOU GOT MONEY TO BUY MORE.: NEVER TRUE

## 2024-10-02 SDOH — ECONOMIC STABILITY: FOOD INSECURITY: WITHIN THE PAST 12 MONTHS, THE FOOD YOU BOUGHT JUST DIDN'T LAST AND YOU DIDN'T HAVE MONEY TO GET MORE.: NEVER TRUE

## 2024-10-02 SDOH — ECONOMIC STABILITY: INCOME INSECURITY: HOW HARD IS IT FOR YOU TO PAY FOR THE VERY BASICS LIKE FOOD, HOUSING, MEDICAL CARE, AND HEATING?: NOT VERY HARD

## 2024-10-02 ASSESSMENT — PATIENT HEALTH QUESTIONNAIRE - PHQ9
SUM OF ALL RESPONSES TO PHQ QUESTIONS 1-9: 0
SUM OF ALL RESPONSES TO PHQ9 QUESTIONS 1 & 2: 0
1. LITTLE INTEREST OR PLEASURE IN DOING THINGS: NOT AT ALL
2. FEELING DOWN, DEPRESSED OR HOPELESS: NOT AT ALL

## 2024-10-02 ASSESSMENT — ENCOUNTER SYMPTOMS
COUGH: 0
SHORTNESS OF BREATH: 0

## 2024-10-02 NOTE — PROGRESS NOTES
Garcia Jaime (:  1965) is a 59 y.o. male,Established patient, here for evaluation of the following chief complaint(s):  Hypertension, Medication Refill, and Flu Vaccine      Assessment & Plan   ASSESSMENT/PLAN:  1. Need for influenza vaccination  -     Influenza, FLUCELVAX Trivalent, (age 6 mo+) IM, Preservative Free, 0.5mL  2. Psoriasis  -     clobetasol (TEMOVATE) 0.05 % cream; Apply topically 2 times daily., Disp-60 g, R-5, Normal  3. Essential hypertension  -     CBC with Auto Differential; Future  -     Comprehensive Metabolic Panel; Future  -     amLODIPine (NORVASC) 5 MG tablet; Take 1 tablet by mouth daily, Disp-90 tablet, R-1Normal  -     lisinopril (PRINIVIL;ZESTRIL) 20 MG tablet; Take 1 tablet by mouth daily, Disp-90 tablet, R-1Normal  4. Mixed hyperlipidemia  -     simvastatin (ZOCOR) 20 MG tablet; Take 1 tablet by mouth nightly, Disp-90 tablet, R-1Normal  5. Screening for thyroid disorder  -     TSH with Reflex; Future  6. Screening, lipid  -     Lipid Panel; Future  7. Encounter for vitamin deficiency screening  -     Vitamin D 25 Hydroxy; Future  8. Screening for malignant neoplasm of prostate  -     PSA Screening; Future      No follow-ups on file.         Subjective   SUBJECTIVE/OBJECTIVE:  Hypertension  Pertinent negatives include no chest pain or shortness of breath.   Medication Refill  Pertinent negatives include no chest pain, chills, coughing or fever.   His psoriasis is bad.  Hasn't had a flare in a long time. Had a depo medrol shot quite a few years ago and this helped.     Review of Systems   Constitutional:  Negative for chills and fever.   Respiratory:  Negative for cough and shortness of breath.    Cardiovascular:  Negative for chest pain and leg swelling.     Objective   Vitals:    10/02/24 1023   BP: 125/83   Pulse: 73     Physical Exam  Constitutional:       General: He is not in acute distress.     Appearance: He is well-developed.   HENT:      Head: Normocephalic.

## 2024-10-17 ENCOUNTER — TELEPHONE (OUTPATIENT)
Dept: PRIMARY CARE CLINIC | Age: 59
End: 2024-10-17

## 2024-10-17 NOTE — TELEPHONE ENCOUNTER
----- Message from Santos IRWIN sent at 10/16/2024 11:29 AM EDT -----  Regarding: ECC Results Request  ECC Results Request    Which lab or imaging result is the patient calling about: blood test    Which provider ordered the test?    Was this a Non-[facility] Provider: No    Date the test was preformed: October 2 2024  --------------------------------------------------------------------------------------------------------------------------    Relationship to Patient: Self     Call Back Info: OK to leave message on voicemail  Preferred Call Back Number: Phone [phone]

## 2025-03-18 DIAGNOSIS — I10 ESSENTIAL HYPERTENSION: ICD-10-CM

## 2025-03-18 DIAGNOSIS — E78.2 MIXED HYPERLIPIDEMIA: ICD-10-CM

## 2025-03-20 RX ORDER — SIMVASTATIN 20 MG
20 TABLET ORAL NIGHTLY
Qty: 90 TABLET | Refills: 0 | Status: SHIPPED | OUTPATIENT
Start: 2025-03-20

## 2025-03-20 RX ORDER — LISINOPRIL 20 MG/1
20 TABLET ORAL DAILY
Qty: 90 TABLET | Refills: 0 | Status: SHIPPED | OUTPATIENT
Start: 2025-03-20

## 2025-03-20 RX ORDER — AMLODIPINE BESYLATE 5 MG/1
5 TABLET ORAL DAILY
Qty: 90 TABLET | Refills: 0 | Status: SHIPPED | OUTPATIENT
Start: 2025-03-20

## 2025-06-16 DIAGNOSIS — L40.9 PSORIASIS: ICD-10-CM

## 2025-06-16 RX ORDER — CLOBETASOL PROPIONATE 0.5 MG/G
CREAM TOPICAL
Qty: 60 G | Refills: 0 | Status: SHIPPED | OUTPATIENT
Start: 2025-06-16

## 2025-06-30 DIAGNOSIS — L40.9 PSORIASIS: ICD-10-CM

## 2025-06-30 DIAGNOSIS — E78.2 MIXED HYPERLIPIDEMIA: ICD-10-CM

## 2025-06-30 DIAGNOSIS — I10 ESSENTIAL HYPERTENSION: ICD-10-CM

## 2025-06-30 RX ORDER — LISINOPRIL 20 MG/1
20 TABLET ORAL DAILY
Qty: 30 TABLET | Refills: 0 | Status: SHIPPED | OUTPATIENT
Start: 2025-06-30

## 2025-06-30 RX ORDER — SIMVASTATIN 20 MG
20 TABLET ORAL NIGHTLY
Qty: 30 TABLET | Refills: 0 | Status: SHIPPED | OUTPATIENT
Start: 2025-06-30

## 2025-06-30 RX ORDER — CLOBETASOL PROPIONATE 0.5 MG/G
CREAM TOPICAL
Qty: 60 G | Refills: 0 | Status: SHIPPED | OUTPATIENT
Start: 2025-06-30

## 2025-06-30 RX ORDER — AMLODIPINE BESYLATE 5 MG/1
5 TABLET ORAL DAILY
Qty: 30 TABLET | Refills: 0 | Status: SHIPPED | OUTPATIENT
Start: 2025-06-30

## 2025-06-30 NOTE — TELEPHONE ENCOUNTER
Last visit: 10/2/24  Last Med refill: 3/20/25, 6/16/25  Does patient have enough medication for 72 hours: No:     Patient needs appt-spoke with patient and scheduled for 7/9    Next Visit Date:  Future Appointments   Date Time Provider Department Center   7/9/2025 10:00 AM Mitch Mayer, APRN - CNP [facility] ECC DEP       Health Maintenance   Topic Date Due    Colorectal Cancer Screen  Never done    Pneumococcal 50+ years Vaccine (2 of 2 - PCV) 05/18/2018    COVID-19 Vaccine (4 - 2024-25 season) 10/02/2025 (Originally 9/1/2024)    Lipids  10/02/2025    Depression Screen  10/02/2025    Prostate Specific Antigen (PSA) Screening or Monitoring  10/02/2025    DTaP/Tdap/Td vaccine (3 - Td or Tdap) 08/21/2033    Respiratory Syncytial Virus (RSV) Pregnant or age 60 yrs+ (1 - 1-dose 75+ series) 02/25/2038    Flu vaccine  Completed    Shingles vaccine  Completed    Hepatitis A vaccine  Aged Out    Hepatitis B vaccine  Aged Out    Hib vaccine  Aged Out    Polio vaccine  Aged Out    Meningococcal (ACWY) vaccine  Aged Out    Meningococcal B vaccine  Aged Out    A1C test (Diabetic or Prediabetic)  Discontinued    Pneumococcal 0-49 years Vaccine  Discontinued    Diabetes screen  Discontinued    Hepatitis C screen  Discontinued    HIV screen  Discontinued       Hemoglobin A1C (%)   Date Value   09/07/2023 5.6   08/02/2022 5.7   08/03/2020 5.7             ( goal A1C is < 7)   No components found for: \"LABMICR\"  No components found for: \"LDLCHOLESTEROL\", \"LDLCALC\"    (goal LDL is <100)   AST (U/L)   Date Value   10/02/2024 27     ALT (U/L)   Date Value   10/02/2024 25     BUN (mg/dL)   Date Value   10/02/2024 13     BP Readings from Last 3 Encounters:   10/02/24 125/83   08/21/23 (!) 156/100   08/02/22 112/60          (goal 120/80)    All Future Testing planned in [facility]  Lab Frequency Next Occurrence               Patient Active Problem List:     Chest pain     Tobacco abuse     Essential hypertension     Mixed

## 2025-07-09 ENCOUNTER — HOSPITAL ENCOUNTER (OUTPATIENT)
Age: 60
Setting detail: SPECIMEN
Discharge: HOME | End: 2025-07-09

## 2025-07-09 ENCOUNTER — OFFICE VISIT (OUTPATIENT)
Dept: FAMILY MEDICINE CLINIC | Age: 60
End: 2025-07-09
Payer: COMMERCIAL

## 2025-07-09 VITALS
HEIGHT: 67 IN | HEART RATE: 107 BPM | SYSTOLIC BLOOD PRESSURE: 124 MMHG | BODY MASS INDEX: 27.15 KG/M2 | WEIGHT: 173 LBS | OXYGEN SATURATION: 98 % | DIASTOLIC BLOOD PRESSURE: 70 MMHG

## 2025-07-09 DIAGNOSIS — E78.2 MIXED HYPERLIPIDEMIA: ICD-10-CM

## 2025-07-09 DIAGNOSIS — Z12.11 ENCOUNTER FOR SCREENING FOR COLORECTAL MALIGNANT NEOPLASM: Primary | ICD-10-CM

## 2025-07-09 DIAGNOSIS — R97.20 ELEVATED PROSTATE SPECIFIC ANTIGEN (PSA): ICD-10-CM

## 2025-07-09 DIAGNOSIS — Z12.12 ENCOUNTER FOR SCREENING FOR COLORECTAL MALIGNANT NEOPLASM: Primary | ICD-10-CM

## 2025-07-09 DIAGNOSIS — Z12.5 SCREENING FOR MALIGNANT NEOPLASM OF PROSTATE: ICD-10-CM

## 2025-07-09 DIAGNOSIS — I10 ESSENTIAL HYPERTENSION: ICD-10-CM

## 2025-07-09 DIAGNOSIS — L40.9 PSORIASIS: ICD-10-CM

## 2025-07-09 LAB
ALBUMIN SERPL-MCNC: 4.4 G/DL (ref 3.5–5.2)
ALBUMIN/GLOB SERPL: 1.7 {RATIO} (ref 1–2.5)
ALP SERPL-CCNC: 55 U/L (ref 40–129)
ALT SERPL-CCNC: 23 U/L (ref 10–50)
ANION GAP SERPL CALCULATED.3IONS-SCNC: 14 MMOL/L (ref 9–16)
AST SERPL-CCNC: 23 U/L (ref 10–50)
BILIRUB SERPL-MCNC: 0.6 MG/DL (ref 0–1.2)
BUN SERPL-MCNC: 13 MG/DL (ref 8–23)
CALCIUM SERPL-MCNC: 9.3 MG/DL (ref 8.6–10.4)
CHLORIDE SERPL-SCNC: 100 MMOL/L (ref 98–107)
CO2 SERPL-SCNC: 25 MMOL/L (ref 20–31)
CREAT SERPL-MCNC: 0.8 MG/DL (ref 0.7–1.2)
ERYTHROCYTE [DISTWIDTH] IN BLOOD BY AUTOMATED COUNT: 13.4 % (ref 11.8–14.4)
GFR, ESTIMATED: >90 ML/MIN/1.73M2
GLUCOSE SERPL-MCNC: 104 MG/DL (ref 74–99)
HCT VFR BLD AUTO: 48.4 % (ref 40.7–50.3)
HGB BLD-MCNC: 16.2 G/DL (ref 13–17)
MCH RBC QN AUTO: 29.8 PG (ref 25.2–33.5)
MCHC RBC AUTO-ENTMCNC: 33.5 G/DL (ref 28.4–34.8)
MCV RBC AUTO: 89 FL (ref 82.6–102.9)
NRBC BLD-RTO: 0 PER 100 WBC
PLATELET # BLD AUTO: 264 K/UL (ref 138–453)
PMV BLD AUTO: 11.6 FL (ref 8.1–13.5)
POTASSIUM SERPL-SCNC: 4.6 MMOL/L (ref 3.7–5.3)
PROT SERPL-MCNC: 7 G/DL (ref 6.6–8.7)
PSA SERPL-MCNC: 4.69 NG/ML (ref 0–4)
RBC # BLD AUTO: 5.44 M/UL (ref 4.21–5.77)
SODIUM SERPL-SCNC: 139 MMOL/L (ref 136–145)
WBC OTHER # BLD: 7.2 K/UL (ref 3.5–11.3)

## 2025-07-09 PROCEDURE — 3078F DIAST BP <80 MM HG: CPT | Performed by: NURSE PRACTITIONER

## 2025-07-09 PROCEDURE — G8427 DOCREV CUR MEDS BY ELIG CLIN: HCPCS | Performed by: NURSE PRACTITIONER

## 2025-07-09 PROCEDURE — G8419 CALC BMI OUT NRM PARAM NOF/U: HCPCS | Performed by: NURSE PRACTITIONER

## 2025-07-09 PROCEDURE — 99214 OFFICE O/P EST MOD 30 MIN: CPT | Performed by: NURSE PRACTITIONER

## 2025-07-09 PROCEDURE — 4004F PT TOBACCO SCREEN RCVD TLK: CPT | Performed by: NURSE PRACTITIONER

## 2025-07-09 PROCEDURE — 3017F COLORECTAL CA SCREEN DOC REV: CPT | Performed by: NURSE PRACTITIONER

## 2025-07-09 PROCEDURE — 3074F SYST BP LT 130 MM HG: CPT | Performed by: NURSE PRACTITIONER

## 2025-07-09 RX ORDER — SIMVASTATIN 20 MG
20 TABLET ORAL NIGHTLY
Qty: 90 TABLET | Refills: 1 | Status: SHIPPED | OUTPATIENT
Start: 2025-07-09

## 2025-07-09 RX ORDER — AMLODIPINE BESYLATE 5 MG/1
5 TABLET ORAL DAILY
Qty: 90 TABLET | Refills: 1 | Status: SHIPPED | OUTPATIENT
Start: 2025-07-09

## 2025-07-09 RX ORDER — LISINOPRIL 20 MG/1
20 TABLET ORAL DAILY
Qty: 90 TABLET | Refills: 1 | Status: SHIPPED | OUTPATIENT
Start: 2025-07-09

## 2025-07-09 RX ORDER — CLOBETASOL PROPIONATE 0.5 MG/G
CREAM TOPICAL
Qty: 60 G | Refills: 0 | Status: SHIPPED | OUTPATIENT
Start: 2025-07-09

## 2025-07-09 SDOH — ECONOMIC STABILITY: FOOD INSECURITY: WITHIN THE PAST 12 MONTHS, THE FOOD YOU BOUGHT JUST DIDN'T LAST AND YOU DIDN'T HAVE MONEY TO GET MORE.: NEVER TRUE

## 2025-07-09 SDOH — ECONOMIC STABILITY: FOOD INSECURITY: WITHIN THE PAST 12 MONTHS, YOU WORRIED THAT YOUR FOOD WOULD RUN OUT BEFORE YOU GOT MONEY TO BUY MORE.: NEVER TRUE

## 2025-07-09 ASSESSMENT — ENCOUNTER SYMPTOMS
COUGH: 0
SHORTNESS OF BREATH: 0

## 2025-07-09 ASSESSMENT — PATIENT HEALTH QUESTIONNAIRE - PHQ9
1. LITTLE INTEREST OR PLEASURE IN DOING THINGS: NOT AT ALL
SUM OF ALL RESPONSES TO PHQ QUESTIONS 1-9: 0
2. FEELING DOWN, DEPRESSED OR HOPELESS: NOT AT ALL
SUM OF ALL RESPONSES TO PHQ QUESTIONS 1-9: 0

## 2025-07-09 NOTE — ASSESSMENT & PLAN NOTE
Chronic, at goal (stable), continue current treatment plan    Orders:    amLODIPine (NORVASC) 5 MG tablet; Take 1 tablet by mouth daily    lisinopril (PRINIVIL;ZESTRIL) 20 MG tablet; Take 1 tablet by mouth daily    CBC; Future    Comprehensive Metabolic Panel; Future

## 2025-07-09 NOTE — PROGRESS NOTES
Garcia Jaime (:  1965) is a 60 y.o. male,Established patient, here for evaluation of the following chief complaint(s):  Hypertension and Medication Refill      Assessment & Plan  Essential hypertension   Chronic, at goal (stable), continue current treatment plan    Orders:    amLODIPine (NORVASC) 5 MG tablet; Take 1 tablet by mouth daily    lisinopril (PRINIVIL;ZESTRIL) 20 MG tablet; Take 1 tablet by mouth daily    CBC; Future    Comprehensive Metabolic Panel; Future    Psoriasis   Chronic, at goal (stable), continue current treatment plan    Orders:    clobetasol (TEMOVATE) 0.05 % cream; APPLY TO THE AFFECTED AREA(S) TWICE DAILY    Mixed hyperlipidemia       Orders:    simvastatin (ZOCOR) 20 MG tablet; Take 1 tablet by mouth nightly    Encounter for screening for colorectal malignant neoplasm       Orders:    Fecal DNA Colorectal cancer screening (Cologuard)    Screening for malignant neoplasm of prostate       Orders:    PSA Screening; Future      No follow-ups on file.       Subjective   Hypertension  Pertinent negatives include no chest pain or shortness of breath.   Medication Refill  Pertinent negatives include no chest pain, chills, coughing or fever.     Urinating 2-3 times a night. Unclear why staff at [facility] never reached out to him with lab results. Will recheck psa and refer to urology.     Review of Systems   Constitutional:  Negative for chills and fever.   Respiratory:  Negative for cough and shortness of breath.    Cardiovascular:  Negative for chest pain and leg swelling.     Objective   Vitals:    25 0953   BP: 124/70   Pulse: (!) 107   SpO2: 98%     Wt Readings from Last 3 Encounters:   25 78.5 kg (173 lb)   10/02/24 78.5 kg (173 lb)   22 78.5 kg (173 lb)       Physical Exam  Constitutional:       General: He is not in acute distress.     Appearance: He is well-developed.   HENT:      Head: Normocephalic.      Right Ear: External ear normal.      Left Ear: External ear